# Patient Record
Sex: FEMALE | Race: OTHER | HISPANIC OR LATINO | URBAN - METROPOLITAN AREA
[De-identification: names, ages, dates, MRNs, and addresses within clinical notes are randomized per-mention and may not be internally consistent; named-entity substitution may affect disease eponyms.]

---

## 2017-03-28 ENCOUNTER — INPATIENT (INPATIENT)
Facility: HOSPITAL | Age: 67
LOS: 2 days | Discharge: HOME | DRG: 535 | End: 2017-03-31
Attending: ORTHOPAEDIC SURGERY | Admitting: ORTHOPAEDIC SURGERY
Payer: COMMERCIAL

## 2017-03-28 VITALS
HEART RATE: 89 BPM | OXYGEN SATURATION: 100 % | SYSTOLIC BLOOD PRESSURE: 121 MMHG | TEMPERATURE: 99 F | DIASTOLIC BLOOD PRESSURE: 66 MMHG | WEIGHT: 130.07 LBS | RESPIRATION RATE: 16 BRPM | HEIGHT: 63 IN

## 2017-03-28 DIAGNOSIS — S72.009A FRACTURE OF UNSPECIFIED PART OF NECK OF UNSPECIFIED FEMUR, INITIAL ENCOUNTER FOR CLOSED FRACTURE: ICD-10-CM

## 2017-03-28 LAB
ALBUMIN SERPL ELPH-MCNC: 3.4 G/DL — LOW (ref 3.5–5)
ALP SERPL-CCNC: 80 U/L — SIGNIFICANT CHANGE UP (ref 40–120)
ALT FLD-CCNC: 23 U/L DA — SIGNIFICANT CHANGE UP (ref 10–60)
ANION GAP SERPL CALC-SCNC: 12 MMOL/L — SIGNIFICANT CHANGE UP (ref 5–17)
APTT BLD: 22.4 SEC — LOW (ref 27.5–37.4)
AST SERPL-CCNC: 23 U/L — SIGNIFICANT CHANGE UP (ref 10–40)
BASOPHILS # BLD AUTO: 0 K/UL — SIGNIFICANT CHANGE UP (ref 0–0.2)
BASOPHILS NFR BLD AUTO: 0.7 % — SIGNIFICANT CHANGE UP (ref 0–2)
BILIRUB SERPL-MCNC: 0.5 MG/DL — SIGNIFICANT CHANGE UP (ref 0.2–1.2)
BUN SERPL-MCNC: 14 MG/DL — SIGNIFICANT CHANGE UP (ref 7–18)
CALCIUM SERPL-MCNC: 8.4 MG/DL — SIGNIFICANT CHANGE UP (ref 8.4–10.5)
CHLORIDE SERPL-SCNC: 109 MMOL/L — HIGH (ref 96–108)
CO2 SERPL-SCNC: 21 MMOL/L — LOW (ref 22–31)
CREAT SERPL-MCNC: 0.57 MG/DL — SIGNIFICANT CHANGE UP (ref 0.5–1.3)
EOSINOPHIL # BLD AUTO: 0.4 K/UL — SIGNIFICANT CHANGE UP (ref 0–0.5)
EOSINOPHIL NFR BLD AUTO: 8.7 % — HIGH (ref 0–6)
GLUCOSE SERPL-MCNC: 106 MG/DL — HIGH (ref 70–99)
HCT VFR BLD CALC: 26.2 % — LOW (ref 34.5–45)
HGB BLD-MCNC: 7.5 G/DL — LOW (ref 11.5–15.5)
INR BLD: 1.05 RATIO — SIGNIFICANT CHANGE UP (ref 0.88–1.16)
LYMPHOCYTES # BLD AUTO: 1 K/UL — SIGNIFICANT CHANGE UP (ref 1–3.3)
LYMPHOCYTES # BLD AUTO: 20.2 % — SIGNIFICANT CHANGE UP (ref 13–44)
MCHC RBC-ENTMCNC: 18 PG — LOW (ref 27–34)
MCHC RBC-ENTMCNC: 28.4 GM/DL — LOW (ref 32–36)
MCV RBC AUTO: 63.3 FL — LOW (ref 80–100)
MONOCYTES # BLD AUTO: 0.3 K/UL — SIGNIFICANT CHANGE UP (ref 0–0.9)
MONOCYTES NFR BLD AUTO: 5.3 % — SIGNIFICANT CHANGE UP (ref 2–14)
NEUTROPHILS # BLD AUTO: 3.1 K/UL — SIGNIFICANT CHANGE UP (ref 1.8–7.4)
NEUTROPHILS NFR BLD AUTO: 65.1 % — SIGNIFICANT CHANGE UP (ref 43–77)
PLATELET # BLD AUTO: 410 K/UL — HIGH (ref 150–400)
POTASSIUM SERPL-MCNC: 3.2 MMOL/L — LOW (ref 3.5–5.3)
POTASSIUM SERPL-SCNC: 3.2 MMOL/L — LOW (ref 3.5–5.3)
PROT SERPL-MCNC: 6.7 G/DL — SIGNIFICANT CHANGE UP (ref 6–8.3)
PROTHROM AB SERPL-ACNC: 11.5 SEC — SIGNIFICANT CHANGE UP (ref 9.8–12.7)
RBC # BLD: 4.14 M/UL — SIGNIFICANT CHANGE UP (ref 3.8–5.2)
RBC # FLD: 20.9 % — HIGH (ref 10.3–14.5)
SODIUM SERPL-SCNC: 142 MMOL/L — SIGNIFICANT CHANGE UP (ref 135–145)
WBC # BLD: 4.8 K/UL — SIGNIFICANT CHANGE UP (ref 3.8–10.5)
WBC # FLD AUTO: 4.8 K/UL — SIGNIFICANT CHANGE UP (ref 3.8–10.5)

## 2017-03-28 PROCEDURE — 99285 EMERGENCY DEPT VISIT HI MDM: CPT

## 2017-03-28 PROCEDURE — 73700 CT LOWER EXTREMITY W/O DYE: CPT | Mod: 26,RT

## 2017-03-28 PROCEDURE — 73502 X-RAY EXAM HIP UNI 2-3 VIEWS: CPT | Mod: 26,RT

## 2017-03-28 RX ORDER — DEXTROSE MONOHYDRATE, SODIUM CHLORIDE, AND POTASSIUM CHLORIDE 50; .745; 4.5 G/1000ML; G/1000ML; G/1000ML
1000 INJECTION, SOLUTION INTRAVENOUS
Qty: 0 | Refills: 0 | Status: DISCONTINUED | OUTPATIENT
Start: 2017-03-28 | End: 2017-03-29

## 2017-03-28 RX ORDER — IPRATROPIUM/ALBUTEROL SULFATE 18-103MCG
3 AEROSOL WITH ADAPTER (GRAM) INHALATION EVERY 6 HOURS
Qty: 0 | Refills: 0 | Status: DISCONTINUED | OUTPATIENT
Start: 2017-03-28 | End: 2017-03-31

## 2017-03-28 RX ORDER — DIPHENHYDRAMINE HCL 50 MG
50 CAPSULE ORAL EVERY 6 HOURS
Qty: 0 | Refills: 0 | Status: DISCONTINUED | OUTPATIENT
Start: 2017-03-28 | End: 2017-03-31

## 2017-03-28 RX ADMIN — Medication 50 MILLIGRAM(S): at 22:31

## 2017-03-28 NOTE — ED PROVIDER NOTE - CONSTITUTIONAL, MLM
normal... Well appearing, well nourished, awake, alert, oriented to person, place, time/situation and in no apparent distress. Ambulatory with limp.

## 2017-03-28 NOTE — ED PROVIDER NOTE - MEDICAL DECISION MAKING DETAILS
65 y/o F pt c/o R leg, R hip pain, and R thigh pain x3 days s/p mechanical fall after slipping on water. Plan for X-Rays to evaluate fractures, and reassess. 65 y/o F pt c/o R leg, R hip pain, and R thigh pain x3 days s/p mechanical fall after slipping on water. Plan for X-Rays to evaluate fractures, and reassess. CT reveals that fx extends to acetabulum, potentially unstable. case discussed with Dr Davis who recommends admission for nonweightbearing until further evaluation.

## 2017-03-28 NOTE — ED PROVIDER NOTE - NS ED MD SCRIBE ATTENDING SCRIBE SECTIONS
REVIEW OF SYSTEMS/PAST MEDICAL/SURGICAL/SOCIAL HISTORY/DISPOSITION/HISTORY OF PRESENT ILLNESS/PHYSICAL EXAM/HIV/VITAL SIGNS( Pullset)

## 2017-03-28 NOTE — ED PROVIDER NOTE - OBJECTIVE STATEMENT
65 y/o F pt with no PMHx and no PSHx presents to ED c/o R leg pain, R hip pain, and R thigh pain x3 days s/p mechanical fall after slipping on water. Pt states pain is worse when walking. Pt denies LOC, numbness, tingling, weakness, or any other complaints. NKDA.

## 2017-03-29 DIAGNOSIS — S72.001A FRACTURE OF UNSPECIFIED PART OF NECK OF RIGHT FEMUR, INITIAL ENCOUNTER FOR CLOSED FRACTURE: ICD-10-CM

## 2017-03-29 LAB
ABO RH CONFIRMATION: SIGNIFICANT CHANGE UP
ANION GAP SERPL CALC-SCNC: 11 MMOL/L — SIGNIFICANT CHANGE UP (ref 5–17)
BUN SERPL-MCNC: 11 MG/DL — SIGNIFICANT CHANGE UP (ref 7–18)
CALCIUM SERPL-MCNC: 7.9 MG/DL — LOW (ref 8.4–10.5)
CHLORIDE SERPL-SCNC: 109 MMOL/L — HIGH (ref 96–108)
CO2 SERPL-SCNC: 24 MMOL/L — SIGNIFICANT CHANGE UP (ref 22–31)
CREAT SERPL-MCNC: 0.36 MG/DL — LOW (ref 0.5–1.3)
GLUCOSE SERPL-MCNC: 97 MG/DL — SIGNIFICANT CHANGE UP (ref 70–99)
HCT VFR BLD CALC: 23.6 % — LOW (ref 34.5–45)
HGB BLD-MCNC: 6.5 G/DL — CRITICAL LOW (ref 11.5–15.5)
IRON SATN MFR SERPL: 23 UG/DL — LOW (ref 40–150)
IRON SATN MFR SERPL: 5 % — LOW (ref 15–50)
LDH SERPL L TO P-CCNC: 267 U/L — HIGH (ref 120–225)
MAGNESIUM SERPL-MCNC: 2.1 MG/DL — SIGNIFICANT CHANGE UP (ref 1.8–2.4)
MCHC RBC-ENTMCNC: 17.5 PG — LOW (ref 27–34)
MCHC RBC-ENTMCNC: 27.7 GM/DL — LOW (ref 32–36)
MCV RBC AUTO: 63.3 FL — LOW (ref 80–100)
PHOSPHATE SERPL-MCNC: 3 MG/DL — SIGNIFICANT CHANGE UP (ref 2.5–4.5)
PLATELET # BLD AUTO: 385 K/UL — SIGNIFICANT CHANGE UP (ref 150–400)
POTASSIUM SERPL-MCNC: 3.4 MMOL/L — LOW (ref 3.5–5.3)
POTASSIUM SERPL-SCNC: 3.4 MMOL/L — LOW (ref 3.5–5.3)
RBC # BLD: 3.74 M/UL — LOW (ref 3.8–5.2)
RBC # FLD: 21 % — HIGH (ref 10.3–14.5)
SODIUM SERPL-SCNC: 144 MMOL/L — SIGNIFICANT CHANGE UP (ref 135–145)
TIBC SERPL-MCNC: 496 UG/DL — HIGH (ref 250–450)
TSH SERPL-MCNC: 1.11 UU/ML — SIGNIFICANT CHANGE UP (ref 0.34–4.82)
UIBC SERPL-MCNC: 473 UG/DL — HIGH (ref 110–370)
WBC # BLD: 4.1 K/UL — SIGNIFICANT CHANGE UP (ref 3.8–10.5)
WBC # FLD AUTO: 4.1 K/UL — SIGNIFICANT CHANGE UP (ref 3.8–10.5)

## 2017-03-29 RX ORDER — POTASSIUM CHLORIDE 20 MEQ
40 PACKET (EA) ORAL EVERY 4 HOURS
Qty: 0 | Refills: 0 | Status: COMPLETED | OUTPATIENT
Start: 2017-03-29 | End: 2017-03-29

## 2017-03-29 RX ORDER — FUROSEMIDE 40 MG
20 TABLET ORAL ONCE
Qty: 0 | Refills: 0 | Status: COMPLETED | OUTPATIENT
Start: 2017-03-29 | End: 2017-03-29

## 2017-03-29 RX ADMIN — Medication 40 MILLIEQUIVALENT(S): at 12:26

## 2017-03-29 RX ADMIN — Medication 20 MILLIGRAM(S): at 14:06

## 2017-03-29 RX ADMIN — Medication 40 MILLIEQUIVALENT(S): at 17:06

## 2017-03-29 RX ADMIN — Medication 50 MILLIGRAM(S): at 22:19

## 2017-03-29 NOTE — H&P ADULT. - PROBLEM SELECTOR PLAN 1
pain control, may weight bear as per Dr Davis if pt tolerates, will hold dvt prophylaxis secondary to low H&H and repeat CBC in am; venodynes; npo after mn for any possible interventions

## 2017-03-29 NOTE — H&P ADULT. - HISTORY OF PRESENT ILLNESS
65 y/o female with h/o asthma; c/o R hip pain s/p slip and fall Sunday. Pt is weight bearing without difficulty  CT R hip done in ED read as nondisplaced fractures of both pubic rami. The superior   fracture extends into the anterior acetabulum. The femoral head and neck   are intact. There is mild to moderate degenerative narrowing of the hip   joint. There is no diastases.

## 2017-03-30 LAB
24R-OH-CALCIDIOL SERPL-MCNC: 26 NG/ML — LOW (ref 30–100)
FERRITIN SERPL-MCNC: 10.7 NG/ML — LOW (ref 15–150)
FOLATE SERPL-MCNC: 17.2 NG/ML — SIGNIFICANT CHANGE UP (ref 4.8–24.2)
HAPTOGLOB SERPL-MCNC: 113 MG/DL — SIGNIFICANT CHANGE UP (ref 34–200)
HCT VFR BLD CALC: 30.8 % — LOW (ref 34.5–45)
HGB BLD-MCNC: 9.2 G/DL — LOW (ref 11.5–15.5)
MCHC RBC-ENTMCNC: 20.1 PG — LOW (ref 27–34)
MCHC RBC-ENTMCNC: 29.8 GM/DL — LOW (ref 32–36)
MCV RBC AUTO: 67.4 FL — LOW (ref 80–100)
PLATELET # BLD AUTO: 391 K/UL — SIGNIFICANT CHANGE UP (ref 150–400)
RBC # BLD: 4.38 M/UL — SIGNIFICANT CHANGE UP (ref 3.8–5.2)
RBC # BLD: 4.56 M/UL — SIGNIFICANT CHANGE UP (ref 3.8–5.2)
RBC # FLD: 23.3 % — HIGH (ref 10.3–14.5)
RETICS #: 52.1 K/UL — SIGNIFICANT CHANGE UP (ref 25–125)
RETICS/RBC NFR: 1.2 % — SIGNIFICANT CHANGE UP (ref 0.5–2.5)
T PALLIDUM AB TITR SER: NEGATIVE — SIGNIFICANT CHANGE UP
TRANSFERRIN SERPL-MCNC: 372 MG/DL — HIGH (ref 200–360)
VIT B12 SERPL-MCNC: 340 PG/ML — SIGNIFICANT CHANGE UP (ref 243–894)
WBC # BLD: 4.2 K/UL — SIGNIFICANT CHANGE UP (ref 3.8–10.5)
WBC # FLD AUTO: 4.2 K/UL — SIGNIFICANT CHANGE UP (ref 3.8–10.5)

## 2017-03-30 PROCEDURE — 72195 MRI PELVIS W/O DYE: CPT | Mod: 26

## 2017-03-30 RX ORDER — HEPARIN SODIUM 5000 [USP'U]/ML
5000 INJECTION INTRAVENOUS; SUBCUTANEOUS EVERY 8 HOURS
Qty: 0 | Refills: 0 | Status: DISCONTINUED | OUTPATIENT
Start: 2017-03-30 | End: 2017-03-31

## 2017-03-30 RX ORDER — IRON SUCROSE 20 MG/ML
300 INJECTION, SOLUTION INTRAVENOUS DAILY
Qty: 0 | Refills: 0 | Status: COMPLETED | OUTPATIENT
Start: 2017-03-30 | End: 2017-03-31

## 2017-03-30 RX ORDER — ASPIRIN/CALCIUM CARB/MAGNESIUM 324 MG
1 TABLET ORAL
Qty: 35 | Refills: 0
Start: 2017-03-30 | End: 2017-03-31

## 2017-03-30 RX ORDER — ASPIRIN/CALCIUM CARB/MAGNESIUM 324 MG
1 TABLET ORAL
Qty: 0 | Refills: 0 | COMMUNITY

## 2017-03-30 RX ORDER — ASPIRIN/CALCIUM CARB/MAGNESIUM 324 MG
1 TABLET ORAL
Qty: 70 | Refills: 0 | OUTPATIENT
Start: 2017-03-30 | End: 2017-05-04

## 2017-03-30 RX ADMIN — HEPARIN SODIUM 5000 UNIT(S): 5000 INJECTION INTRAVENOUS; SUBCUTANEOUS at 21:14

## 2017-03-30 RX ADMIN — IRON SUCROSE 176.67 MILLIGRAM(S): 20 INJECTION, SOLUTION INTRAVENOUS at 20:16

## 2017-03-30 RX ADMIN — HEPARIN SODIUM 5000 UNIT(S): 5000 INJECTION INTRAVENOUS; SUBCUTANEOUS at 13:22

## 2017-03-30 RX ADMIN — Medication 50 MILLIGRAM(S): at 18:08

## 2017-03-30 NOTE — DISCHARGE NOTE ADULT - MEDICATION SUMMARY - MEDICATIONS TO STOP TAKING
I will STOP taking the medications listed below when I get home from the hospital:  None I will STOP taking the medications listed below when I get home from the hospital:     mg oral tablet  -- 1 tab(s) by mouth every 6 hours prn pain/swelling  -- Do not take this drug if you are pregnant.  It is very important that you take or use this exactly as directed.  Do not skip doses or discontinue unless directed by your doctor.  May cause drowsiness or dizziness.  Obtain medical advice before taking any non-prescription drugs as some may affect the action of this medication.  Take with food or milk.

## 2017-03-30 NOTE — DISCHARGE NOTE ADULT - PATIENT PORTAL LINK FT
“You can access the FollowHealth Patient Portal, offered by [facility], by registering with the following website: [email or web address]”

## 2017-03-30 NOTE — DISCHARGE NOTE ADULT - CARE PROVIDER_API CALL
Sander Davis), Orthopaedic Surgery  [address] [place] [address]  Phone: [phone]  Fax: [phone]

## 2017-03-30 NOTE — DISCHARGE NOTE ADULT - PLAN OF CARE
IMPROVE ROM 1. pain management  2. Take aspirin 325mg twice daily by mouth for 5 weeks  3. Partial weight bearing of right leg with walker  4. Follow up with Dr. Davis within 2 weeks 1. Follow up as outpatient with Gastroenterologist  2. Follow up as outpatient for Endoscopy/Colonoscopy 1. pain management  2. Take aspirin 325mg once daily by mouth for 5 weeks  3. Partial weight bearing of right leg with walker  4. Follow up with Dr. Davis within 2 weeks

## 2017-03-30 NOTE — DISCHARGE NOTE ADULT - CARE PLAN
Principal Discharge DX:	Closed fracture of right hip, initial encounter  Goal:	IMPROVE ROM Principal Discharge DX:	Closed fracture of right hip, initial encounter  Goal:	IMPROVE ROM  Instructions for follow-up, activity and diet:	1. pain management  2. Take aspirin 325mg twice daily by mouth for 5 weeks  3. Partial weight bearing of right leg with walker  4. Follow up with Dr. Davis within 2 weeks Principal Discharge DX:	Closed fracture of right hip, initial encounter  Goal:	IMPROVE ROM  Instructions for follow-up, activity and diet:	1. pain management  2. Take aspirin 325mg once daily by mouth for 5 weeks  3. Partial weight bearing of right leg with walker  4. Follow up with Dr. Davis within 2 weeks  Secondary Diagnosis:	Anemia  Instructions for follow-up, activity and diet:	1. Follow up as outpatient with Gastroenterologist  2. Follow up as outpatient for Endoscopy/Colonoscopy

## 2017-03-30 NOTE — DISCHARGE NOTE ADULT - HOSPITAL COURSE
patient with Right pubic ramus fx and right acetabular fracture (non-displaced).  Pt was admitted with Hemoglobin in 6.5.  Patient was transfused 2units pRBC on 3/29/2017.  MRI of pelvis showed patient with Right pubic ramus fx and right acetabular fracture (non-displaced).  Pt was admitted with Hemoglobin in 6.5.  Patient was transfused 2units pRBC on 3/29/2017.  MRI of pelvis showed puboacetabular fracture.

## 2017-03-30 NOTE — DISCHARGE NOTE ADULT - MEDICATION SUMMARY - MEDICATIONS TO TAKE
I will START or STAY ON the medications listed below when I get home from the hospital:    Percocet 5/325 oral tablet  -- 1-2 tab(s) by mouth every 6 hours as needed for moderate pain  -- Indication: For PAIN    aspirin 325 mg oral tablet  -- 1 tab(s) by mouth 2 times a day  -- X 4 WEEKS.  D/C ON 4/28/2017  -- Indication: For DVT PPX    albuterol 0.63 mg/3 mL (0.021%) inhalation solution  -- 3 milliliter(s) inhaled 3 times a day as needed for asthma/wheezing  -- Indication: For AS PER MD    methocarbamol 500 mg oral tablet  -- 2 tab(s) by mouth every 6 hours prn pain/spasm  -- May cause drowsiness.  Alcohol may intensify this effect.  Use care when operating dangerous machinery.    -- Indication: For AS PER MD I will START or STAY ON the medications listed below when I get home from the hospital:    aspirin 325 mg oral tablet  -- 1 tab(s) by mouth 2 times a day  -- X 4 WEEKS.  D/C ON 4/28/2017  -- Indication: For dvt prophylaxis    Percocet 5/325 oral tablet  -- 1 tab(s) by mouth every 4 hours MDD:6 tabs  -- Indication: For pain    albuterol 0.63 mg/3 mL (0.021%) inhalation solution  -- 3 milliliter(s) inhaled 3 times a day as needed for asthma/wheezing  -- Indication: For AS PER MD    methocarbamol 500 mg oral tablet  -- 2 tab(s) by mouth every 6 hours prn pain/spasm  -- May cause drowsiness.  Alcohol may intensify this effect.  Use care when operating dangerous machinery.    -- Indication: For AS PER MD I will START or STAY ON the medications listed below when I get home from the hospital:    Percocet 5/325 oral tablet  -- 1 tab(s) by mouth every 4 hours MDD:6 tabs  -- Indication: For pain    Ascriptin buffered 325 mg oral tablet  -- 1 tab(s) by mouth once a day MDD:1  -- Take with food or milk.    -- Indication: For dvt ppx    albuterol 0.63 mg/3 mL (0.021%) inhalation solution  -- 3 milliliter(s) inhaled 3 times a day as needed for asthma/wheezing  -- Indication: For AS PER MD    methocarbamol 500 mg oral tablet  -- 2 tab(s) by mouth every 6 hours prn pain/spasm  -- May cause drowsiness.  Alcohol may intensify this effect.  Use care when operating dangerous machinery.    -- Indication: For AS PER MD

## 2017-03-31 VITALS — SYSTOLIC BLOOD PRESSURE: 118 MMHG | OXYGEN SATURATION: 99 % | DIASTOLIC BLOOD PRESSURE: 64 MMHG | HEART RATE: 70 BPM

## 2017-03-31 PROCEDURE — 86920 COMPATIBILITY TEST SPIN: CPT

## 2017-03-31 PROCEDURE — 85027 COMPLETE CBC AUTOMATED: CPT

## 2017-03-31 PROCEDURE — 72195 MRI PELVIS W/O DYE: CPT

## 2017-03-31 PROCEDURE — 84100 ASSAY OF PHOSPHORUS: CPT

## 2017-03-31 PROCEDURE — 73700 CT LOWER EXTREMITY W/O DYE: CPT

## 2017-03-31 PROCEDURE — 83550 IRON BINDING TEST: CPT

## 2017-03-31 PROCEDURE — 85730 THROMBOPLASTIN TIME PARTIAL: CPT

## 2017-03-31 PROCEDURE — 83615 LACTATE (LD) (LDH) ENZYME: CPT

## 2017-03-31 PROCEDURE — 82746 ASSAY OF FOLIC ACID SERUM: CPT

## 2017-03-31 PROCEDURE — 83735 ASSAY OF MAGNESIUM: CPT

## 2017-03-31 PROCEDURE — 36430 TRANSFUSION BLD/BLD COMPNT: CPT

## 2017-03-31 PROCEDURE — 82306 VITAMIN D 25 HYDROXY: CPT

## 2017-03-31 PROCEDURE — P9040: CPT

## 2017-03-31 PROCEDURE — 80053 COMPREHEN METABOLIC PANEL: CPT

## 2017-03-31 PROCEDURE — 85610 PROTHROMBIN TIME: CPT

## 2017-03-31 PROCEDURE — 86900 BLOOD TYPING SEROLOGIC ABO: CPT

## 2017-03-31 PROCEDURE — 84466 ASSAY OF TRANSFERRIN: CPT

## 2017-03-31 PROCEDURE — 82607 VITAMIN B-12: CPT

## 2017-03-31 PROCEDURE — 82728 ASSAY OF FERRITIN: CPT

## 2017-03-31 PROCEDURE — 83010 ASSAY OF HAPTOGLOBIN QUANT: CPT

## 2017-03-31 PROCEDURE — 86780 TREPONEMA PALLIDUM: CPT

## 2017-03-31 PROCEDURE — 84443 ASSAY THYROID STIM HORMONE: CPT

## 2017-03-31 PROCEDURE — 99285 EMERGENCY DEPT VISIT HI MDM: CPT | Mod: 25

## 2017-03-31 PROCEDURE — 93005 ELECTROCARDIOGRAM TRACING: CPT

## 2017-03-31 PROCEDURE — 73502 X-RAY EXAM HIP UNI 2-3 VIEWS: CPT

## 2017-03-31 PROCEDURE — 85045 AUTOMATED RETICULOCYTE COUNT: CPT

## 2017-03-31 PROCEDURE — 80048 BASIC METABOLIC PNL TOTAL CA: CPT

## 2017-03-31 PROCEDURE — 86850 RBC ANTIBODY SCREEN: CPT

## 2017-03-31 PROCEDURE — 86901 BLOOD TYPING SEROLOGIC RH(D): CPT

## 2017-03-31 RX ADMIN — IRON SUCROSE 176.67 MILLIGRAM(S): 20 INJECTION, SOLUTION INTRAVENOUS at 11:57

## 2017-03-31 RX ADMIN — Medication 50 MILLIGRAM(S): at 10:24

## 2017-03-31 RX ADMIN — HEPARIN SODIUM 5000 UNIT(S): 5000 INJECTION INTRAVENOUS; SUBCUTANEOUS at 05:31

## 2017-03-31 NOTE — PHYSICAL THERAPY INITIAL EVALUATION ADULT - CRITERIA FOR SKILLED THERAPEUTIC INTERVENTIONS
anticipated discharge recommendation/predicted duration of therapy intervention/therapy frequency/anticipated equipment needs at discharge/impairments found

## 2017-03-31 NOTE — PHYSICAL THERAPY INITIAL EVALUATION ADULT - ACTIVE RANGE OF MOTION EXAMINATION, REHAB EVAL
bilateral  lower extremity Active ROM was WFL (within functional limits)/bilateral upper extremity Active ROM was WFL (within functional limits)/With C/O pain in Right hip.

## 2017-04-04 DIAGNOSIS — Z28.21 IMMUNIZATION NOT CARRIED OUT BECAUSE OF PATIENT REFUSAL: ICD-10-CM

## 2017-04-04 DIAGNOSIS — S70.11XA CONTUSION OF RIGHT THIGH, INITIAL ENCOUNTER: ICD-10-CM

## 2017-04-04 DIAGNOSIS — Z91.040 LATEX ALLERGY STATUS: ICD-10-CM

## 2017-04-04 DIAGNOSIS — D50.0 IRON DEFICIENCY ANEMIA SECONDARY TO BLOOD LOSS (CHRONIC): ICD-10-CM

## 2017-04-04 DIAGNOSIS — S32.591A OTHER SPECIFIED FRACTURE OF RIGHT PUBIS, INITIAL ENCOUNTER FOR CLOSED FRACTURE: ICD-10-CM
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
## 2017-04-04 DIAGNOSIS — S32.592A OTHER SPECIFIED FRACTURE OF LEFT PUBIS, INITIAL ENCOUNTER FOR CLOSED FRACTURE: ICD-10-CM
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
## 2017-04-04 DIAGNOSIS — J45.909 UNSPECIFIED ASTHMA, UNCOMPLICATED: ICD-10-CM

## 2017-04-04 DIAGNOSIS — S32.401A UNSPECIFIED FRACTURE OF RIGHT ACETABULUM, INITIAL ENCOUNTER FOR CLOSED FRACTURE: ICD-10-CM
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
## 2017-04-05 DIAGNOSIS — D63.8 ANEMIA IN OTHER CHRONIC DISEASES CLASSIFIED ELSEWHERE: ICD-10-CM

## 2020-09-22 NOTE — ED PROVIDER NOTE - VASCULAR COMPROMISE
Physical Therapy  Physical Therapy Treatment Note    Name: Kb Ozuna  : 1946  MRN: [id number]    Referring Provider:  Giulia Falcon MD     Date of Service: 2020    Evaluating PT:  Main Sotelo, PT, DPT [id number]    Room #:  5525/1701-I  Diagnosis:  Hydropcephalus  Precautions: Falls,   Procedure/Surgery:   insertion of lumbar drain  PMHx/PSHx:  CA, CAD, COPD, HLD, HTN, PVD, RA  Equipment Needs:  TBD    SUBJECTIVE:    Pt lives with daughter in a 2 story home with 3 stairs to enter and 2 rail. Full flight of steps and 1 rail to bedroom. Pt ambulated with [employer] PTA. OBJECTIVE:   Re-Evaluation  Date: 20 Treatment  20 Short Term/ Long Term   Goals   AM-PAC 6 Clicks 31/10 84/61    Was pt agreeable to Eval/treatment? Yes Yes    Does pt have pain? No c/o pain 4/10 neck and back pain    Bed Mobility  Rolling: NT  Supine to sit: SBA with HOB elevated  Sit to supine: NT  Scooting: SBA Rolling: NT  Supine to sit: SBA with HOB elevated  Sit to supine: SBA  Scooting: SBA Mod Independent   Transfers Sit to stand: Hany  Stand to sit: Hany  Stand pivot: Hany with Ww Sit to stand: SBA  Stand to sit: SBA  Stand pivot: CGA with [employer] Mod Independent with [employer]   Ambulation   80 feet with Hany with [employer] 100 feet with CGA with [employer] >400 feet with Mod Independent with [employer]   Stair negotiation: ascended and descended NT NT >10 steps with 1 rail Mod Independent   ROM BUE:  Defer to OT note  BLE:  WNL     Strength BUE:  Defer to OT note  BLE:  4/5  Increase by 1/3 MMT grade   Balance Sitting EOB:  Hany  Dynamic Standing:  Hany with [employer] Sitting EOB:  SBA  Dynamic Standing:  CGA with [employer] Sitting EOB:  Independent  Dynamic Standing:   Mod Independent with [employer]     Pt is A & O x 4  Sensation:  WNL  Edema:  None    Therapeutic Exercises:  NA    Patient education  Pt educated on safety    Patient response to education:   Pt verbalized understanding Pt demonstrated skill Pt requires further education in this area   x x x no vascular compromise

## 2021-03-30 RX ADMIN — OXYCODONE AND ACETAMINOPHEN 1 TABLET(S): 5; 325 TABLET ORAL at 23:00

## 2021-03-31 ENCOUNTER — INPATIENT (INPATIENT)
Facility: HOSPITAL | Age: 71
LOS: 1 days | Discharge: HOME | DRG: 812 | End: 2021-04-02
Attending: INTERNAL MEDICINE | Admitting: INTERNAL MEDICINE
Payer: COMMERCIAL

## 2021-03-31 VITALS
RESPIRATION RATE: 16 BRPM | TEMPERATURE: 99 F | HEIGHT: 63 IN | WEIGHT: 139.99 LBS | DIASTOLIC BLOOD PRESSURE: 69 MMHG | OXYGEN SATURATION: 99 % | SYSTOLIC BLOOD PRESSURE: 137 MMHG | HEART RATE: 96 BPM

## 2021-03-31 DIAGNOSIS — S32.9XXA FRACTURE OF UNSPECIFIED PARTS OF LUMBOSACRAL SPINE AND PELVIS, INITIAL ENCOUNTER FOR CLOSED FRACTURE: ICD-10-CM

## 2021-03-31 LAB
ALBUMIN SERPL ELPH-MCNC: 3.4 G/DL — LOW (ref 3.5–5)
ALP SERPL-CCNC: 92 U/L — SIGNIFICANT CHANGE UP (ref 40–120)
ALT FLD-CCNC: 29 U/L DA — SIGNIFICANT CHANGE UP (ref 10–60)
ANION GAP SERPL CALC-SCNC: 8 MMOL/L — SIGNIFICANT CHANGE UP (ref 5–17)
ANISOCYTOSIS BLD QL: SLIGHT — SIGNIFICANT CHANGE UP
APTT BLD: 25.5 SEC — LOW (ref 27.5–35.5)
AST SERPL-CCNC: 27 U/L — SIGNIFICANT CHANGE UP (ref 10–40)
BASOPHILS # BLD AUTO: 0.02 K/UL — SIGNIFICANT CHANGE UP (ref 0–0.2)
BASOPHILS NFR BLD AUTO: 0.3 % — SIGNIFICANT CHANGE UP (ref 0–2)
BILIRUB SERPL-MCNC: 0.4 MG/DL — SIGNIFICANT CHANGE UP (ref 0.2–1.2)
BLD GP AB SCN SERPL QL: SIGNIFICANT CHANGE UP
BUN SERPL-MCNC: 20 MG/DL — HIGH (ref 7–18)
CALCIUM SERPL-MCNC: 8.2 MG/DL — LOW (ref 8.4–10.5)
CHLORIDE SERPL-SCNC: 106 MMOL/L — SIGNIFICANT CHANGE UP (ref 96–108)
CO2 SERPL-SCNC: 26 MMOL/L — SIGNIFICANT CHANGE UP (ref 22–31)
CREAT SERPL-MCNC: 0.85 MG/DL — SIGNIFICANT CHANGE UP (ref 0.5–1.3)
DACRYOCYTES BLD QL SMEAR: SLIGHT — SIGNIFICANT CHANGE UP
ELLIPTOCYTES BLD QL SMEAR: SLIGHT — SIGNIFICANT CHANGE UP
EOSINOPHIL # BLD AUTO: 0.49 K/UL — SIGNIFICANT CHANGE UP (ref 0–0.5)
EOSINOPHIL NFR BLD AUTO: 6.9 % — HIGH (ref 0–6)
ETHANOL SERPL-MCNC: <3 MG/DL — SIGNIFICANT CHANGE UP (ref 0–10)
GLUCOSE SERPL-MCNC: 147 MG/DL — HIGH (ref 70–99)
HCT VFR BLD CALC: 22.1 % — LOW (ref 34.5–45)
HGB BLD-MCNC: 5.8 G/DL — CRITICAL LOW (ref 11.5–15.5)
HYPOCHROMIA BLD QL: SIGNIFICANT CHANGE UP
IMM GRANULOCYTES NFR BLD AUTO: 0.6 % — SIGNIFICANT CHANGE UP (ref 0–1.5)
INR BLD: 1.08 RATIO — SIGNIFICANT CHANGE UP (ref 0.88–1.16)
LYMPHOCYTES # BLD AUTO: 1.05 K/UL — SIGNIFICANT CHANGE UP (ref 1–3.3)
LYMPHOCYTES # BLD AUTO: 14.7 % — SIGNIFICANT CHANGE UP (ref 13–44)
MANUAL SMEAR VERIFICATION: SIGNIFICANT CHANGE UP
MCHC RBC-ENTMCNC: 14.8 PG — LOW (ref 27–34)
MCHC RBC-ENTMCNC: 26.2 GM/DL — LOW (ref 32–36)
MCV RBC AUTO: 56.2 FL — LOW (ref 80–100)
MICROCYTES BLD QL: SLIGHT — SIGNIFICANT CHANGE UP
MONOCYTES # BLD AUTO: 0.4 K/UL — SIGNIFICANT CHANGE UP (ref 0–0.9)
MONOCYTES NFR BLD AUTO: 5.6 % — SIGNIFICANT CHANGE UP (ref 2–14)
NEUTROPHILS # BLD AUTO: 5.15 K/UL — SIGNIFICANT CHANGE UP (ref 1.8–7.4)
NEUTROPHILS NFR BLD AUTO: 71.9 % — SIGNIFICANT CHANGE UP (ref 43–77)
NRBC # BLD: 0 /100 WBCS — SIGNIFICANT CHANGE UP (ref 0–0)
OB PNL STL: NEGATIVE — SIGNIFICANT CHANGE UP
OVALOCYTES BLD QL SMEAR: SIGNIFICANT CHANGE UP
PLAT MORPH BLD: NORMAL — SIGNIFICANT CHANGE UP
PLATELET # BLD AUTO: 443 K/UL — HIGH (ref 150–400)
POIKILOCYTOSIS BLD QL AUTO: SLIGHT — SIGNIFICANT CHANGE UP
POTASSIUM SERPL-MCNC: 3.4 MMOL/L — LOW (ref 3.5–5.3)
POTASSIUM SERPL-SCNC: 3.4 MMOL/L — LOW (ref 3.5–5.3)
PROT SERPL-MCNC: 6.4 G/DL — SIGNIFICANT CHANGE UP (ref 6–8.3)
PROTHROM AB SERPL-ACNC: 12.8 SEC — SIGNIFICANT CHANGE UP (ref 10.6–13.6)
RBC # BLD: 3.93 M/UL — SIGNIFICANT CHANGE UP (ref 3.8–5.2)
RBC # FLD: 21.5 % — HIGH (ref 10.3–14.5)
RBC BLD AUTO: ABNORMAL
SODIUM SERPL-SCNC: 140 MMOL/L — SIGNIFICANT CHANGE UP (ref 135–145)
TARGETS BLD QL SMEAR: SLIGHT — SIGNIFICANT CHANGE UP
TROPONIN I SERPL-MCNC: <0.015 NG/ML — SIGNIFICANT CHANGE UP (ref 0–0.04)
WBC # BLD: 7.15 K/UL — SIGNIFICANT CHANGE UP (ref 3.8–10.5)
WBC # FLD AUTO: 7.15 K/UL — SIGNIFICANT CHANGE UP (ref 3.8–10.5)

## 2021-03-31 PROCEDURE — 70450 CT HEAD/BRAIN W/O DYE: CPT | Mod: 26

## 2021-03-31 PROCEDURE — 72125 CT NECK SPINE W/O DYE: CPT | Mod: 26

## 2021-03-31 PROCEDURE — 73030 X-RAY EXAM OF SHOULDER: CPT | Mod: 26,LT

## 2021-03-31 PROCEDURE — 73501 X-RAY EXAM HIP UNI 1 VIEW: CPT | Mod: 26,LT

## 2021-03-31 PROCEDURE — 71045 X-RAY EXAM CHEST 1 VIEW: CPT | Mod: 26

## 2021-03-31 PROCEDURE — 99285 EMERGENCY DEPT VISIT HI MDM: CPT

## 2021-03-31 PROCEDURE — 73080 X-RAY EXAM OF ELBOW: CPT | Mod: 26,LT

## 2021-03-31 RX ORDER — DIPHENHYDRAMINE HCL 50 MG
25 CAPSULE ORAL ONCE
Refills: 0 | Status: COMPLETED | OUTPATIENT
Start: 2021-03-31 | End: 2021-03-31

## 2021-03-31 RX ORDER — PANTOPRAZOLE SODIUM 20 MG/1
40 TABLET, DELAYED RELEASE ORAL ONCE
Refills: 0 | Status: COMPLETED | OUTPATIENT
Start: 2021-03-31 | End: 2021-03-31

## 2021-03-31 RX ORDER — OXYCODONE AND ACETAMINOPHEN 5; 325 MG/1; MG/1
1 TABLET ORAL ONCE
Refills: 0 | Status: DISCONTINUED | OUTPATIENT
Start: 2021-03-31 | End: 2021-03-31

## 2021-03-31 RX ADMIN — PANTOPRAZOLE SODIUM 40 MILLIGRAM(S): 20 TABLET, DELAYED RELEASE ORAL at 20:51

## 2021-03-31 RX ADMIN — OXYCODONE AND ACETAMINOPHEN 1 TABLET(S): 5; 325 TABLET ORAL at 22:34

## 2021-03-31 NOTE — H&P ADULT - PROBLEM SELECTOR PLAN 1
patient presented after fall at home  noted to have left pubic rami fracture on hip x-ray; f/u official read  f/u shoulder x-ray  ortho consulted by ED  pain control  PT kaylynn

## 2021-03-31 NOTE — H&P ADULT - PROBLEM SELECTOR PLAN 2
patient noted to have Hgb of 5.8 in ED  patient has some ecchymosis of LUE  denies any melena or hematemesis  FOBT negative  may be from PUD; NSAID use   1 PRBC given in ED  patient states her baseline Hgb is around 7  will do post transfusion CBC and monitor Hgb  monitor CBC daily   protonix daily patient noted to have Hgb of 5.8 in ED  patient has some ecchymosis of LUE  denies any melena or hematemesis  FOBT negative  may be from PUD; NSAID use   1 PRBC given in ED  patient states her baseline Hgb is around 7  morning Hgb of 6.5; will order 2nd PRBC  GI, Dr. Tan consulted  monitor CBC daily   protonix IV BID

## 2021-03-31 NOTE — ED ADULT NURSE NOTE - OBJECTIVE STATEMENT
pt is here for s/p fall.  pt stated that fall down d/t loss of balance on Saturday, left shoulder and arm pain with bruises, left groin pain, denied chest pain or sob, denied LOC or headache, no distress noted at this time.

## 2021-03-31 NOTE — H&P ADULT - MUSCULOSKELETAL
decreased ROM in LLE due to pain/no joint swelling/no joint erythema/no joint warmth/no calf tenderness/decreased ROM due to pain details… detailed exam

## 2021-03-31 NOTE — ED PROVIDER NOTE - PHYSICAL EXAMINATION
General: pt lying in stretcher, appears stated age and is not in distress  HEENT: AT/NC, pink conjunctiva, anicteric sclerae, EOMI, PERRLA, mmm  Neck: supple, full ROM, trachea midline, no JVD, no cervical LAD, no midline ttp or stepoffs  Lungs: symmetric excursion, b/l clear vesicular breath sounds with no wheezes, crackles, or rhonchi  Heart: rrr, S1, S2 normal; no S3 or S4; no murmurs or rubs  Abd: normal bowel sounds; soft, nontender; negative McBurney's point tenderness, negative Thompson's sign, no rebound, no guarding, spleen non-palpable; no hepatomegaly, no masses  Back: no midline spinal tenderness or stepoffs, no costovertebral angle tenderness  Extremities: no clubbing, cyanosis, or edema; no palpable deformities or fractures; LUE w/ ecchymosis at shoulder to elbow w/ tenderness but no deformity and 2+distal RP; tenderness at medial left hip  Skin: good turgor; no rashes, or jaundice  Pulses: radial, posterior tibialis (PT), dorsalis pedis (DP) all 2+ & symmetric  Neuro: awake, alert, responsive; oriented to person, place and time; cranial nerves intact, EOMI, intact jaw movement, intact facial sensation, no facial asymmetry, hearing intact; no nystagmus, tongue midline; Motor: Normal tone in upper and lower extremities bilaterally strength 5/5; Sensory: intact

## 2021-03-31 NOTE — ED ADULT NURSE NOTE - CHIEF COMPLAINT QUOTE
S/P fall last Saturday with c/o pain to left shoulder, left arm and left groin" I was sitting for a long time, when I got up my legs were weak and I lost my balance and fell". Son is concerned that pt is anemic as has been eating a lot of ice lately. Son Willem [phone]

## 2021-03-31 NOTE — H&P ADULT - PROBLEM SELECTOR PLAN 3
noted to have potassium of 3.4 in ED  supplement ordered  monitor BMP daily and replace electrolytes as needed

## 2021-03-31 NOTE — H&P ADULT - HISTORY OF PRESENT ILLNESS
Patient is a 70 year old female from home AAO x3, with PMH of PUD, anemia, asthma, arthritis and osteoporosis, who presented to the ED due to fall. Patient states that on Saturday, she was at a party and drank some alcohol and was sitting for a while and then when she got up, she felt cramps in her legs and lost her balance and had fallen mainly on to her left side. Patient denies any LOC or head trauma. Patient has been having some pain in left pubic area wh Patient is a 70 year old female from home AAO x3, with PMH of PUD, anemia, asthma, arthritis and osteoporosis, who presented to the ED due to fall. Patient states that on Saturday, she was at a party and drank some alcohol and was sitting for a while and then when she got up, she felt cramps in her legs and lost her balance and had fallen mainly on to her left side. Patient denies any LOC or head trauma. Patient has been having some pain in left pubic area when walking. Spoke to patient's son, Willem ([phone]) for further history. Son states that patient had fallen while at a party. Son also states that he believes the fall could also have been due to anemia. Son states patient has history of anemia and PUD noted on EGD about two years ago. States that patient has been eating ice chips a lot more recently and is likely due to anemia. Patient has received blood transfusions in the past. Currently patient denies any chest pain, SOB, melena, hematemesis, nausea, vomiting, abdominal pain or diarrhea.

## 2021-03-31 NOTE — ED PROVIDER NOTE - CLINICAL SUMMARY MEDICAL DECISION MAKING FREE TEXT BOX
Pt w/ aforementioned presentation found to have marked anemia and left pubic rami fx. Ortho consulted and patient given blood and admitted

## 2021-03-31 NOTE — H&P ADULT - ASSESSMENT
Patient is a 70 year old female from home AAO x3, with PMH of PUD, anemia, asthma, arthritis and osteoporosis, who presented to the ED due to fall.  Patient is a 70 year old female from home AAO x3, with PMH of PUD, anemia, asthma, arthritis and osteoporosis, who presented to the ED due to fall.     Hgb noted to be 5.8. FOBT negative. Potassium of 3.4. Troponin negative x1. BAL negative. CT head showing mild anterior periventricular white matter ischemia. Global atrophy. Cervical spine negative for vertebral fracture. Given dose of percocet, protonix and 1 PRBC in ED.  Hip xray noted to have left pubic rami fracture. Ortho consulted by ED.

## 2021-03-31 NOTE — ED ADULT NURSE NOTE - ED STAT RN HANDOFF DETAILS 2
Pt is alert and oriented x3. Pt is medicated for order. No sign of acute distress noted at this time. Pt is transferred to ED holding in stable condition.

## 2021-03-31 NOTE — H&P ADULT - PROBLEM SELECTOR PLAN 4
patient with history of PUD   may be cause of anemia   protonix daily   avoid NSAIDs patient with history of PUD   may be cause of anemia   protonix IV BID  avoid NSAIDs

## 2021-03-31 NOTE — ED ADULT TRIAGE NOTE - CHIEF COMPLAINT QUOTE
S/P fall last Saturday with c/o pain to left shoulder, left arm and left groin" I was sitting for a long time, when I got up my legs were weak and I lost my balance and fell". Son is concerned that pt is anemic as has been eating a lot of ice lately. Son Willem [phone]

## 2021-04-01 DIAGNOSIS — E87.6 HYPOKALEMIA: ICD-10-CM

## 2021-04-01 DIAGNOSIS — J45.909 UNSPECIFIED ASTHMA, UNCOMPLICATED: ICD-10-CM

## 2021-04-01 DIAGNOSIS — Z71.89 OTHER SPECIFIED COUNSELING: ICD-10-CM

## 2021-04-01 DIAGNOSIS — D64.9 ANEMIA, UNSPECIFIED: ICD-10-CM

## 2021-04-01 DIAGNOSIS — K27.9 PEPTIC ULCER, SITE UNSPECIFIED, UNSPECIFIED AS ACUTE OR CHRONIC, WITHOUT HEMORRHAGE OR PERFORATION: ICD-10-CM

## 2021-04-01 DIAGNOSIS — Z29.9 ENCOUNTER FOR PROPHYLACTIC MEASURES, UNSPECIFIED: ICD-10-CM

## 2021-04-01 DIAGNOSIS — S32.9XXA FRACTURE OF UNSPECIFIED PARTS OF LUMBOSACRAL SPINE AND PELVIS, INITIAL ENCOUNTER FOR CLOSED FRACTURE: ICD-10-CM

## 2021-04-01 LAB
A1C WITH ESTIMATED AVERAGE GLUCOSE RESULT: 5.7 % — HIGH (ref 4–5.6)
ESTIMATED AVERAGE GLUCOSE: 117 MG/DL — HIGH (ref 68–114)
HCT VFR BLD CALC: 24.3 % — LOW (ref 34.5–45)
HCT VFR BLD CALC: 27.8 % — LOW (ref 34.5–45)
HCV AB S/CO SERPL IA: 0.04 S/CO — SIGNIFICANT CHANGE UP (ref 0–0.99)
HCV AB SERPL-IMP: SIGNIFICANT CHANGE UP
HGB BLD-MCNC: 6.7 G/DL — CRITICAL LOW (ref 11.5–15.5)
HGB BLD-MCNC: 8.1 G/DL — LOW (ref 11.5–15.5)
MCHC RBC-ENTMCNC: 16.3 PG — LOW (ref 27–34)
MCHC RBC-ENTMCNC: 18 PG — LOW (ref 27–34)
MCHC RBC-ENTMCNC: 27.6 GM/DL — LOW (ref 32–36)
MCHC RBC-ENTMCNC: 29.1 GM/DL — LOW (ref 32–36)
MCV RBC AUTO: 59.3 FL — LOW (ref 80–100)
MCV RBC AUTO: 61.8 FL — LOW (ref 80–100)
NRBC # BLD: 0 /100 WBCS — SIGNIFICANT CHANGE UP (ref 0–0)
NRBC # BLD: 0 /100 WBCS — SIGNIFICANT CHANGE UP (ref 0–0)
PLATELET # BLD AUTO: 396 K/UL — SIGNIFICANT CHANGE UP (ref 150–400)
PLATELET # BLD AUTO: 408 K/UL — HIGH (ref 150–400)
RAPID RVP RESULT: SIGNIFICANT CHANGE UP
RBC # BLD: 4.1 M/UL — SIGNIFICANT CHANGE UP (ref 3.8–5.2)
RBC # BLD: 4.5 M/UL — SIGNIFICANT CHANGE UP (ref 3.8–5.2)
RBC # FLD: 26.5 % — HIGH (ref 10.3–14.5)
RBC # FLD: 28.7 % — HIGH (ref 10.3–14.5)
SARS-COV-2 RNA SPEC QL NAA+PROBE: SIGNIFICANT CHANGE UP
WBC # BLD: 5.78 K/UL — SIGNIFICANT CHANGE UP (ref 3.8–10.5)
WBC # BLD: 6.46 K/UL — SIGNIFICANT CHANGE UP (ref 3.8–10.5)
WBC # FLD AUTO: 5.78 K/UL — SIGNIFICANT CHANGE UP (ref 3.8–10.5)
WBC # FLD AUTO: 6.46 K/UL — SIGNIFICANT CHANGE UP (ref 3.8–10.5)

## 2021-04-01 PROCEDURE — 99233 SBSQ HOSP IP/OBS HIGH 50: CPT

## 2021-04-01 RX ORDER — ACETAMINOPHEN 500 MG
650 TABLET ORAL EVERY 6 HOURS
Refills: 0 | Status: DISCONTINUED | OUTPATIENT
Start: 2021-04-01 | End: 2021-04-02

## 2021-04-01 RX ORDER — PANTOPRAZOLE SODIUM 20 MG/1
40 TABLET, DELAYED RELEASE ORAL
Refills: 0 | Status: DISCONTINUED | OUTPATIENT
Start: 2021-04-01 | End: 2021-04-02

## 2021-04-01 RX ORDER — LIDOCAINE 4 G/100G
1 CREAM TOPICAL DAILY
Refills: 0 | Status: DISCONTINUED | OUTPATIENT
Start: 2021-04-01 | End: 2021-04-02

## 2021-04-01 RX ORDER — POLYETHYLENE GLYCOL 3350 17 G/17G
17 POWDER, FOR SOLUTION ORAL DAILY
Refills: 0 | Status: DISCONTINUED | OUTPATIENT
Start: 2021-04-01 | End: 2021-04-02

## 2021-04-01 RX ORDER — POTASSIUM CHLORIDE 20 MEQ
40 PACKET (EA) ORAL EVERY 4 HOURS
Refills: 0 | Status: COMPLETED | OUTPATIENT
Start: 2021-04-01 | End: 2021-04-01

## 2021-04-01 RX ORDER — PANTOPRAZOLE SODIUM 20 MG/1
40 TABLET, DELAYED RELEASE ORAL
Refills: 0 | Status: DISCONTINUED | OUTPATIENT
Start: 2021-04-01 | End: 2021-04-01

## 2021-04-01 RX ORDER — TRAMADOL HYDROCHLORIDE 50 MG/1
50 TABLET ORAL EVERY 8 HOURS
Refills: 0 | Status: DISCONTINUED | OUTPATIENT
Start: 2021-04-01 | End: 2021-04-02

## 2021-04-01 RX ORDER — DIPHENHYDRAMINE HCL 50 MG
25 CAPSULE ORAL EVERY 6 HOURS
Refills: 0 | Status: DISCONTINUED | OUTPATIENT
Start: 2021-04-01 | End: 2021-04-02

## 2021-04-01 RX ORDER — SENNA PLUS 8.6 MG/1
2 TABLET ORAL AT BEDTIME
Refills: 0 | Status: DISCONTINUED | OUTPATIENT
Start: 2021-04-01 | End: 2021-04-02

## 2021-04-01 RX ORDER — ONDANSETRON 8 MG/1
4 TABLET, FILM COATED ORAL EVERY 6 HOURS
Refills: 0 | Status: DISCONTINUED | OUTPATIENT
Start: 2021-04-01 | End: 2021-04-02

## 2021-04-01 RX ORDER — ALBUTEROL 90 UG/1
2 AEROSOL, METERED ORAL EVERY 6 HOURS
Refills: 0 | Status: DISCONTINUED | OUTPATIENT
Start: 2021-04-01 | End: 2021-04-02

## 2021-04-01 RX ADMIN — TRAMADOL HYDROCHLORIDE 50 MILLIGRAM(S): 50 TABLET ORAL at 17:39

## 2021-04-01 RX ADMIN — Medication 650 MILLIGRAM(S): at 08:57

## 2021-04-01 RX ADMIN — Medication 40 MILLIEQUIVALENT(S): at 04:35

## 2021-04-01 RX ADMIN — TRAMADOL HYDROCHLORIDE 50 MILLIGRAM(S): 50 TABLET ORAL at 18:00

## 2021-04-01 RX ADMIN — PANTOPRAZOLE SODIUM 40 MILLIGRAM(S): 20 TABLET, DELAYED RELEASE ORAL at 17:40

## 2021-04-01 RX ADMIN — Medication 40 MILLIEQUIVALENT(S): at 01:42

## 2021-04-01 RX ADMIN — Medication 650 MILLIGRAM(S): at 09:00

## 2021-04-01 RX ADMIN — LIDOCAINE 1 PATCH: 4 CREAM TOPICAL at 12:48

## 2021-04-01 RX ADMIN — Medication 25 MILLIGRAM(S): at 15:51

## 2021-04-01 RX ADMIN — Medication 25 MILLIGRAM(S): at 05:02

## 2021-04-01 RX ADMIN — PANTOPRAZOLE SODIUM 40 MILLIGRAM(S): 20 TABLET, DELAYED RELEASE ORAL at 05:02

## 2021-04-01 RX ADMIN — ALBUTEROL 2 PUFF(S): 90 AEROSOL, METERED ORAL at 17:40

## 2021-04-01 RX ADMIN — Medication 25 MILLIGRAM(S): at 00:21

## 2021-04-01 RX ADMIN — LIDOCAINE 1 PATCH: 4 CREAM TOPICAL at 19:23

## 2021-04-01 RX ADMIN — SENNA PLUS 2 TABLET(S): 8.6 TABLET ORAL at 21:05

## 2021-04-02 VITALS — WEIGHT: 139.99 LBS

## 2021-04-02 DIAGNOSIS — M25.552 PAIN IN LEFT HIP: ICD-10-CM

## 2021-04-02 DIAGNOSIS — D64.89 OTHER SPECIFIED ANEMIAS: ICD-10-CM

## 2021-04-02 DIAGNOSIS — S32.9XXA FRACTURE OF UNSPECIFIED PARTS OF LUMBOSACRAL SPINE AND PELVIS, INITIAL ENCOUNTER FOR CLOSED FRACTURE: ICD-10-CM

## 2021-04-02 LAB
ANION GAP SERPL CALC-SCNC: 7 MMOL/L — SIGNIFICANT CHANGE UP (ref 5–17)
BUN SERPL-MCNC: 10 MG/DL — SIGNIFICANT CHANGE UP (ref 7–18)
CALCIUM SERPL-MCNC: 8.3 MG/DL — LOW (ref 8.4–10.5)
CHLORIDE SERPL-SCNC: 106 MMOL/L — SIGNIFICANT CHANGE UP (ref 96–108)
CO2 SERPL-SCNC: 27 MMOL/L — SIGNIFICANT CHANGE UP (ref 22–31)
CREAT SERPL-MCNC: 0.54 MG/DL — SIGNIFICANT CHANGE UP (ref 0.5–1.3)
GLUCOSE SERPL-MCNC: 92 MG/DL — SIGNIFICANT CHANGE UP (ref 70–99)
HCT VFR BLD CALC: 29.6 % — LOW (ref 34.5–45)
HGB BLD-MCNC: 8.5 G/DL — LOW (ref 11.5–15.5)
MCHC RBC-ENTMCNC: 17.8 PG — LOW (ref 27–34)
MCHC RBC-ENTMCNC: 28.7 GM/DL — LOW (ref 32–36)
MCV RBC AUTO: 61.9 FL — LOW (ref 80–100)
NRBC # BLD: 0 /100 WBCS — SIGNIFICANT CHANGE UP (ref 0–0)
PLATELET # BLD AUTO: 396 K/UL — SIGNIFICANT CHANGE UP (ref 150–400)
POTASSIUM SERPL-MCNC: 4.1 MMOL/L — SIGNIFICANT CHANGE UP (ref 3.5–5.3)
POTASSIUM SERPL-SCNC: 4.1 MMOL/L — SIGNIFICANT CHANGE UP (ref 3.5–5.3)
RBC # BLD: 4.78 M/UL — SIGNIFICANT CHANGE UP (ref 3.8–5.2)
RBC # FLD: 28.7 % — HIGH (ref 10.3–14.5)
SODIUM SERPL-SCNC: 140 MMOL/L — SIGNIFICANT CHANGE UP (ref 135–145)
WBC # BLD: 6.3 K/UL — SIGNIFICANT CHANGE UP (ref 3.8–10.5)
WBC # FLD AUTO: 6.3 K/UL — SIGNIFICANT CHANGE UP (ref 3.8–10.5)

## 2021-04-02 PROCEDURE — 82306 VITAMIN D 25 HYDROXY: CPT

## 2021-04-02 PROCEDURE — 85610 PROTHROMBIN TIME: CPT

## 2021-04-02 PROCEDURE — 85027 COMPLETE CBC AUTOMATED: CPT

## 2021-04-02 PROCEDURE — 86769 SARS-COV-2 COVID-19 ANTIBODY: CPT

## 2021-04-02 PROCEDURE — 86803 HEPATITIS C AB TEST: CPT

## 2021-04-02 PROCEDURE — 0225U NFCT DS DNA&RNA 21 SARSCOV2: CPT

## 2021-04-02 PROCEDURE — 80061 LIPID PANEL: CPT

## 2021-04-02 PROCEDURE — 99285 EMERGENCY DEPT VISIT HI MDM: CPT

## 2021-04-02 PROCEDURE — 84443 ASSAY THYROID STIM HORMONE: CPT

## 2021-04-02 PROCEDURE — 82728 ASSAY OF FERRITIN: CPT

## 2021-04-02 PROCEDURE — 36430 TRANSFUSION BLD/BLD COMPNT: CPT

## 2021-04-02 PROCEDURE — 83540 ASSAY OF IRON: CPT

## 2021-04-02 PROCEDURE — 86850 RBC ANTIBODY SCREEN: CPT

## 2021-04-02 PROCEDURE — 70450 CT HEAD/BRAIN W/O DYE: CPT

## 2021-04-02 PROCEDURE — 73501 X-RAY EXAM HIP UNI 1 VIEW: CPT

## 2021-04-02 PROCEDURE — 82607 VITAMIN B-12: CPT

## 2021-04-02 PROCEDURE — 99233 SBSQ HOSP IP/OBS HIGH 50: CPT

## 2021-04-02 PROCEDURE — 72125 CT NECK SPINE W/O DYE: CPT

## 2021-04-02 PROCEDURE — 86901 BLOOD TYPING SEROLOGIC RH(D): CPT

## 2021-04-02 PROCEDURE — 82746 ASSAY OF FOLIC ACID SERUM: CPT

## 2021-04-02 PROCEDURE — 84484 ASSAY OF TROPONIN QUANT: CPT

## 2021-04-02 PROCEDURE — 71045 X-RAY EXAM CHEST 1 VIEW: CPT

## 2021-04-02 PROCEDURE — 85025 COMPLETE CBC W/AUTO DIFF WBC: CPT

## 2021-04-02 PROCEDURE — 82272 OCCULT BLD FECES 1-3 TESTS: CPT

## 2021-04-02 PROCEDURE — 80053 COMPREHEN METABOLIC PANEL: CPT

## 2021-04-02 PROCEDURE — 86900 BLOOD TYPING SEROLOGIC ABO: CPT

## 2021-04-02 PROCEDURE — 94640 AIRWAY INHALATION TREATMENT: CPT

## 2021-04-02 PROCEDURE — 36415 COLL VENOUS BLD VENIPUNCTURE: CPT

## 2021-04-02 PROCEDURE — 80307 DRUG TEST PRSMV CHEM ANLYZR: CPT

## 2021-04-02 PROCEDURE — 83735 ASSAY OF MAGNESIUM: CPT

## 2021-04-02 PROCEDURE — 84100 ASSAY OF PHOSPHORUS: CPT

## 2021-04-02 PROCEDURE — 80048 BASIC METABOLIC PNL TOTAL CA: CPT

## 2021-04-02 PROCEDURE — 83036 HEMOGLOBIN GLYCOSYLATED A1C: CPT

## 2021-04-02 PROCEDURE — 73030 X-RAY EXAM OF SHOULDER: CPT

## 2021-04-02 PROCEDURE — 73080 X-RAY EXAM OF ELBOW: CPT

## 2021-04-02 PROCEDURE — 85730 THROMBOPLASTIN TIME PARTIAL: CPT

## 2021-04-02 PROCEDURE — 83550 IRON BINDING TEST: CPT

## 2021-04-02 PROCEDURE — 86923 COMPATIBILITY TEST ELECTRIC: CPT

## 2021-04-02 PROCEDURE — P9040: CPT

## 2021-04-02 RX ORDER — CHOLECALCIFEROL (VITAMIN D3) 125 MCG
400 CAPSULE ORAL
Qty: 30 | Refills: 0
Start: 2021-04-02 | End: 2021-05-01

## 2021-04-02 RX ORDER — CALCIUM ACETATE 667 MG
1 TABLET ORAL
Qty: 30 | Refills: 0
Start: 2021-04-02 | End: 2021-05-01

## 2021-04-02 RX ORDER — CHOLECALCIFEROL (VITAMIN D3) 125 MCG
400 CAPSULE ORAL DAILY
Refills: 0 | Status: DISCONTINUED | OUTPATIENT
Start: 2021-04-02 | End: 2021-04-02

## 2021-04-02 RX ORDER — MELOXICAM 15 MG/1
1 TABLET ORAL
Qty: 0 | Refills: 0 | DISCHARGE

## 2021-04-02 RX ORDER — ACETAMINOPHEN 500 MG
1000 TABLET ORAL EVERY 8 HOURS
Refills: 0 | Status: DISCONTINUED | OUTPATIENT
Start: 2021-04-02 | End: 2021-04-02

## 2021-04-02 RX ORDER — CALCIUM ACETATE 667 MG
667 TABLET ORAL
Refills: 0 | Status: DISCONTINUED | OUTPATIENT
Start: 2021-04-02 | End: 2021-04-02

## 2021-04-02 RX ORDER — MELOXICAM 15 MG/1
1 TABLET ORAL
Qty: 14 | Refills: 0
Start: 2021-04-02 | End: 2021-04-15

## 2021-04-02 RX ADMIN — LIDOCAINE 1 PATCH: 4 CREAM TOPICAL at 00:48

## 2021-04-02 RX ADMIN — PANTOPRAZOLE SODIUM 40 MILLIGRAM(S): 20 TABLET, DELAYED RELEASE ORAL at 05:27

## 2021-04-02 RX ADMIN — Medication 400 UNIT(S): at 13:01

## 2021-04-02 RX ADMIN — LIDOCAINE 1 PATCH: 4 CREAM TOPICAL at 11:34

## 2021-04-02 RX ADMIN — PANTOPRAZOLE SODIUM 40 MILLIGRAM(S): 20 TABLET, DELAYED RELEASE ORAL at 17:19

## 2021-04-02 RX ADMIN — Medication 1000 MILLIGRAM(S): at 13:01

## 2021-04-02 RX ADMIN — Medication 667 MILLIGRAM(S): at 13:01

## 2021-04-02 RX ADMIN — Medication 1000 MILLIGRAM(S): at 14:39

## 2021-04-02 NOTE — CONSULT NOTE ADULT - NEGATIVE GASTROINTESTINAL SYMPTOMS
no nausea/no vomiting/no diarrhea/no constipation/no abdominal pain/no melena/no hematochezia/no steatorrhea/no jaundice

## 2021-04-02 NOTE — DISCHARGE NOTE PROVIDER - HOSPITAL COURSE
Patient is 70 year old female from home with PMH of PUD, anemia, asthma, arthritis and osteoporosis, who presented to ED s/p fall c/o pain in left hip/ pubic area   In ED found to have Hg of  5.8. FOBT negative. Troponin negative x1. BAL negative. CT head showing mild anterior periventricular white matter ischemia. Global atrophy. Cervical spine negative for vertebral fracture. Received 1 unit of PRBC in ED.  Hip xray noted to have left pubic rami fracture. Admitted for symptomatic anemia and pelvic fracture. Ortho consulted with recommendation for conservative management, pain management and daily PT.        --------------------incomplete--------------------------     Patient is 70 year old female from home with PMH of PUD, anemia, asthma, arthritis and osteoporosis, who presented to ED s/p fall c/o pain in left hip/ pubic area   In ED found to have Hg of  5.8. FOBT negative. Troponin negative x1. BAL negative. CT head showing mild anterior periventricular white matter ischemia. Global atrophy. Cervical spine negative for vertebral fracture. Received 1 unit of PRBC in ED.  Hip xray noted to have left pubic rami fracture. Admitted for symptomatic anemia and pelvic fracture. Ortho consulted with recommendation for conservative management, pain management and daily PT. Patient evaluated by PT with recommendation for home physical therapy. SW consulted.        Given patient's improved clinical status and current hemodynamic stability, decision was made to discharge.  Please refer to patient's complete medical chart with documents for a full hospital course, for this is only a brief summary.

## 2021-04-02 NOTE — DISCHARGE NOTE PROVIDER - CARE PROVIDER_API CALL
CORI GODOY  Internal Medicine  [address], [address] [address]  Phone: [phone]  Fax: [phone]  Follow Up Time: 1 week    Andres Elizabeth)  Orthopaedic Surgery  [address] [facility], [address] [place] [address]  Phone: [phone]  Fax: [phone]  Follow Up Time: 1 week

## 2021-04-02 NOTE — SBIRT NOTE ADULT - NSSBIRTALCNOACTINTDET_GEN_A_CORE
Patient reports prior to intermediate and when she was employed she would attend parties and drink frequently with friends. patient reporting it was not a problem for her to stop and has not had a drink in 5 years since intermediate.

## 2021-04-02 NOTE — DISCHARGE NOTE NURSING/CASE MANAGEMENT/SOCIAL WORK - PATIENT PORTAL LINK FT
You can access the FollowMyHealth Patient Portal offered by [facility] by registering at the following website: [email or web address]. By joining [facility]’s FollowMyHealth portal, you will also be able to view your health information using other applications (apps) compatible with our system.

## 2021-04-02 NOTE — DISCHARGE NOTE PROVIDER - NSDCMRMEDTOKEN_GEN_ALL_CORE_FT
Advair Diskus 250 mcg-50 mcg inhalation powder: 1 puff(s) inhaled 2 times a day  meloxicam 15 mg oral tablet: 1 tab(s) orally once a day  Naprosyn:   omeprazole 40 mg oral delayed release capsule: 1 cap(s) orally once a day  traZODone 50 mg oral tablet: 1 tab(s) orally once a day, As Needed   Advair Diskus 250 mcg-50 mcg inhalation powder: 1 puff(s) inhaled 2 times a day  calcium acetate 667 mg oral tablet: 1 tab(s) orally once a day   cholecalciferol oral tablet: 400 unit(s) orally once a day  meloxicam 15 mg oral tablet: 1 tab(s) orally once a day  Naprosyn:   omeprazole 40 mg oral delayed release capsule: 1 cap(s) orally once a day  traZODone 50 mg oral tablet: 1 tab(s) orally once a day, As Needed   Advair Diskus 250 mcg-50 mcg inhalation powder: 1 puff(s) inhaled 2 times a day  calcium acetate 667 mg oral tablet: 1 tab(s) orally once a day   cholecalciferol oral tablet: 400 unit(s) orally once a day  meloxicam 15 mg oral tablet: 1 tab(s) orally once a day  omeprazole 40 mg oral delayed release capsule: 1 cap(s) orally once a day  traZODone 50 mg oral tablet: 1 tab(s) orally once a day, As Needed

## 2021-04-02 NOTE — DISCHARGE NOTE PROVIDER - NSDCCPCAREPLAN_GEN_ALL_CORE_FT
PRINCIPAL DISCHARGE DIAGNOSIS  Diagnosis: Pelvic fracture  Assessment and Plan of Treatment: Pelvic Fracture(s)  A rpelvic fracture is a break or crack in one of the pelvic bones. A broken or cracked pelvic bone is often painful, but most do not cause other problems and heal on their own over time. Symptoms include pain when you walk or pain when pressing over the area. Physical therapy with mild exercises and pain management are recommended for faster recovery.  SEEK IMMEDIATE MEDICAL CARE IF YOU HAVE ANY OF THE FOLLOWING SYMPTOMS: abdominal pain, nausea or vomiting, pain not controlled with medications.        SECONDARY DISCHARGE DIAGNOSES  Diagnosis: Anemia due to other cause  Assessment and Plan of Treatment: Symptoms to report, bleeding, palpitations, fatigue, pale skin, cold skin, dizziness. Take medications as ordered by PCP

## 2021-04-02 NOTE — PROGRESS NOTE ADULT - ASSESSMENT
Confidential Drug Utilization Report  Search Terms: jesika rosario, 1950Search Date: 04/02/2021 11:43:33 AM  The Drug Utilization Report below displays all of the controlled substance prescriptions, if any, that your patient has filled in the last twelve months. The information displayed on this report is compiled from pharmacy submissions to the Department, and accurately reflects the information as submitted by the pharmacies.    This report was requested by: Yani Calle | Reference #: [id number]    There are no results for the search terms that you entered.
Patient is a 70 year old female from home AAO x3, with PMH of PUD, anemia, asthma, arthritis and osteoporosis, who presented to the ED due to fall. Patient states that on Saturday, she was at a party and drank some alcohol and was sitting for a while and then when she got up, she felt cramps in her legs and lost her balance and had fallen mainly on to her left side. Patient denies any LOC or head trauma. Patient admitted to medicine for symptomatic anemia and pelvic fracture. Patient received 2U PRBC in ED for H&H 5.8/22.1 now improved to 8.5/27.0. Patient CT head and c-spine negative for acute findings, X-ray hip and pelvis revels old appearing bilateral ischio pubic rami fracture. Ortho consulted with recommendation for PT and pain management.    Patient seen and examined at bedside. Currently patient denies any chest pain, SOB, melena, hematemesis, nausea, vomiting, abdominal pain or diarrhea.   
70 year old female from home with PMH of PUD, anemia, asthma, arthritis and osteoporosis, who presented to ED sp fall co pain in left hip/ pubic area   In ED found to have Hg of  5.8. FOBT negative. Troponin negative x1. BAL negative. CT head showing mild anterior periventricular white matter ischemia. Global atrophy. Cervical spine negative for vertebral fracture. Received 1 unit of PRBC in ED.  Hip xray noted to have left pubic rami fracture.  Admitted for symptomatic anemia and pelvic fracture   Pt seen at bedside, states is feeling much better, co mild pain to left pubic area radiating to left hip. No chest pain, dizziness, or difficulties breathing   CBC after first unit improved to 6.7. Received the second unit of blood.

## 2021-04-02 NOTE — DISCHARGE NOTE PROVIDER - PROVIDER TOKENS
PROVIDER:[TOKEN:[[id number]],FOLLOWUP:[1 week]],PROVIDER:[TOKEN:[[id number]],FOLLOWUP:[1 week]]

## 2021-04-02 NOTE — CONSULT NOTE ADULT - NEGATIVE ENMT SYMPTOMS
no ear pain/no tinnitus/no vertigo/no nose bleeds/no gum bleeding/no dry mouth/no throat pain/no dysphagia

## 2021-04-02 NOTE — CONSULT NOTE ADULT - SUBJECTIVE AND OBJECTIVE BOX
Pt Name: TY MARTINEZ  MRN: [id number]    ORTHOPEDIC CONSULT:    Orthopedic diagnosis:    70yFemaleHPI:  Patient is a 70 year old female from home AAO x3, with PMH of PUD, anemia, asthma, arthritis and osteoporosis, who presented to the ED due to fall. Patient states that on Saturday, she was at a party and drank some alcohol and was sitting for a while and then when she got up, she felt cramps in her legs and lost her balance and had fallen mainly on to her left side. Patient denies any LOC or head trauma. Patient has been having some pain in left pubic area when walking. Spoke to patient's son, Willem ([phone]) for further history. Son states that patient had fallen while at a party. Son also states that he believes the fall could also have been due to anemia. Son states patient has history of anemia and PUD noted on EGD about two years ago. States that patient has been eating ice chips a lot more recently and is likely due to anemia. Patient has received blood transfusions in the past. Currently patient denies any chest pain, SOB, melena, hematemesis, nausea, vomiting, abdominal pain or diarrhea. (31 Mar 2021 23:58)    poor history from patient, history from chart    AMBULATION: Baseline Ambulation [x] independent [ ] With Cane [ ] With Walker [ ]  Bedbound [ ] Pivot transfers to Wheelchair only    PAST MEDICAL & SURGICAL HISTORY:  Peptic ulcer disease    Anemia    Asthma    No significant past surgical history        ALLERGIES: dust (Unknown)  ibuprofen (Rash)  latex (Rash)  shellfish (Unknown)      MEDICATIONS: acetaminophen   Tablet .. 650 milliGRAM(s) Oral every 6 hours PRN  ALBUTerol    90 MICROgram(s) HFA Inhaler 2 Puff(s) Inhalation every 6 hours PRN  diphenhydrAMINE   Injectable 25 milliGRAM(s) IV Push every 6 hours PRN  lidocaine   Patch 1 Patch Transdermal daily  ondansetron Injectable 4 milliGRAM(s) IV Push every 6 hours PRN  pantoprazole  Injectable 40 milliGRAM(s) IV Push two times a day  polyethylene glycol 3350 17 Gram(s) Oral daily PRN  senna 2 Tablet(s) Oral at bedtime      PHYSICAL EXAM:    Vital Signs Last 24 Hrs  T(C): 36.6 (01 Apr 2021 08:51), Max: 37.2 (31 Mar 2021 17:48)  T(F): 97.9 (01 Apr 2021 08:51), Max: 98.9 (31 Mar 2021 17:48)  HR: 80 (01 Apr 2021 08:51) (66 - 96)  BP: 124/53 (01 Apr 2021 08:51) (124/53 - 142/81)  BP(mean): --  RR: 16 (01 Apr 2021 08:51) (12 - 18)  SpO2: 98% (01 Apr 2021 08:51) (96% - 99%)    Gen: well developed, well nourished, comfortable  Rectal: deferred  Extremities: no clubbing/cyanosis, no edema, no calf tenderness  Vascular:  DP/PT 2+ b/l  Neurological: no focal deficits  Skin: no rash on visible skin  Musculoskeletal:      L groin pain, pain to compression hip mild  motor sensory grossly intact    LABS:                        6.5    5.18  )-----------( 384      ( 01 Apr 2021 06:31 )             23.2     04-01    141  |  111<H>  |  13  ----------------------------<  85  4.9   |  25  |  0.49<L>    Ca    7.8<L>      01 Apr 2021 06:31  Phos  2.9     04-01  Mg     2.1     04-01    TPro  6.4  /  Alb  3.4<L>  /  TBili  0.4  /  DBili  x   /  AST  27  /  ALT  29  /  AlkPhos  92  03-31    PT/INR - ( 01 Apr 2021 06:31 )   PT: 12.5 sec;   INR: 1.05 ratio         PTT - ( 01 Apr 2021 06:31 )  PTT:24.7 sec    RADIOLOGY: L inf sup rami fx    IMPRESSION: Pt is a  70y Female with L inf sup rami fx stable pelvic fx    PLAN:  -  Pain management  -  DVT prophylaxis  -  Daily PT  -  WBAT w assistive devices PRN
[  ] STAT REQUEST              [  ]ROUTINE REQUEST    Patient is a 70 year old female with symptomatic sever iron deficiency anemia. GI consulted to evaluate.       HPI:  Patient is a 70 year old female from home AAO x3, with past medical history of PUD, asthma, arthritis and osteoporosis, who presented to the ED due to fall. Patient states that on Saturday, she was at a party and drank some alcohol and was sitting for a while and then when she got up, she felt cramps in her legs and lost her balance and had fallen mainly on to her left side.  On admission patient found to have iron deficiency anemia. Patient denies abdominal pain, nausea, vomiting, hematemesis, hematochezia melena, fever, chills, chest pain, SOB, cough, hematuria or diarrhea.    PAIN MANAGEMENT:  Pain Scale:                 0/10  Pain Location:      Prior Colonoscopy:  Unknown    PAST MEDICAL HISTORY  Peptic ulcer disease  Anemia  Asthma  Atrthritis  Osteoprosis      PAST SURGICAL HISTORY  No significant past surgical history        Allergies    dust (Unknown)  ibuprofen (Rash)  latex (Rash)  shellfish (Unknown)    Intolerances  None       MEDICATIONS  (STANDING):  acetaminophen   Tablet .. 1000 milliGRAM(s) Oral every 8 hours  calcium acetate 667 milliGRAM(s) Oral three times a day with meals  cholecalciferol 400 Unit(s) Oral daily  lidocaine   Patch 1 Patch Transdermal daily  pantoprazole  Injectable 40 milliGRAM(s) IV Push two times a day  senna 2 Tablet(s) Oral at bedtime    MEDICATIONS  (PRN):  ALBUTerol    90 MICROgram(s) HFA Inhaler 2 Puff(s) Inhalation every 6 hours PRN Shortness of Breath and/or Wheezing  diphenhydrAMINE   Injectable 25 milliGRAM(s) IV Push every 6 hours PRN Allergy symptoms  ondansetron Injectable 4 milliGRAM(s) IV Push every 6 hours PRN Nausea and/or Vomiting  polyethylene glycol 3350 17 Gram(s) Oral daily PRN Constipation  traMADol 50 milliGRAM(s) Oral every 8 hours PRN Moderate Pain (4 - 6)      SOCIAL HISTORY  Advanced Directives:       [ X ] Full Code       [  ] DNR  Marital Status:         [  ] M      [ X ] S      [  ] D       [  ] W  Children:       [ X ] Yes      [  ] No  Occupation:        [  ] Employed       [ X ] Unemployed       [  ] Retired  Diet:       [ X ] Regular       [  ] PEG feeding          [  ] NG tube feeding  Drug Use:           [ X ] Patient denied          [  ] Yes  Alcohol:           [ X ] No             [  ] Yes (socially)         [  ] Yes (chronic)  Tobacco:           [  ] Yes           [ X ] No      FAMILY HISTORY  [ X ] Heart Disease            [X] Diabetes             [ X] HTN             [  ] Colon Cancer             [  ] Stomach Cancer              [  ] Pancreatic Cancer      VITAL SIGNS   Vital Signs Last 24 Hrs  T(C): 36.4 (02 Apr 2021 13:33), Max: 36.9 (01 Apr 2021 20:43)  T(F): 97.6 (02 Apr 2021 13:33), Max: 98.4 (01 Apr 2021 20:43)  HR: 80 (02 Apr 2021 13:33) (73 - 80)  BP: 141/84 (02 Apr 2021 13:33) (119/68 - 156/73)   RR: 16 (02 Apr 2021 13:33) (16 - 18)  SpO2: 98% (02 Apr 2021 13:33) (97% - 100%)       CBC Full  -  ( 02 Apr 2021 05:51 )  WBC Count : 6.30 K/uL  RBC Count : 4.78 M/uL  Hemoglobin : 8.5 g/dL  Hematocrit : 29.6 %  Platelet Count - Automated : 396 K/uL  Mean Cell Volume : 61.9 fl  Mean Cell Hemoglobin : 17.8 pg  Mean Cell Hemoglobin Concentration : 28.7 gm/dL  Auto Neutrophil # : x  Auto Lymphocyte # : x  Auto Monocyte # : x  Auto Eosinophil # : x  Auto Basophil # : x  Auto Neutrophil % : x  Auto Lymphocyte % : x  Auto Monocyte % : x  Auto Eosinophil % : x  Auto Basophil % : x      04-02    140  |  106  |  10  ----------------------------<  92  4.1   |  27  |  0.54    Ca    8.3<L>      02 Apr 2021 05:51  Phos  2.9     04-01  Mg     2.1     04-01    TPro  6.4  /  Alb  3.4<L>  /  TBili  0.4  /  DBili  x   /  AST  27  /  ALT  29  /  AlkPhos  92  03-31     PT/INR - ( 01 Apr 2021 06:31 )   PT: 12.5 sec;   INR: 1.05 ratio       PTT - ( 01 Apr 2021 06:31 )  PTT:24.7 sec     Occult Blood, Feces (03.31.21 @ 21:00)   Occult Blood, Feces: Negative Iron with Total Binding Capacity (04.01.21 @ 06:31)   Iron - Total Binding Capacity.: 413 ug/dL   % Saturation, Iron: 4 %   Iron Total, Serum: 18 ug/dL   Unsaturated Iron Binding Capacity: 395 ug/dL       ECG  Ventricular Rate 82 BPM    Atrial Rate 82 BPM    P-R Interval 124 ms    QRS Duration 82 ms     ms    QTc 465 ms    P Axis 40 degrees    R Axis 18 degrees    T Axis 76 degrees    Diagnosis Line Normal sinus rhythm  Nonspecific T wave abnormality  Abnormal ECG    RADIOLOGY/IMAGING                `EXAM:  CT CERVICAL SPINE                          EXAM:  CT BRAIN                            PROCEDURE DATE:  03/31/2021          INTERPRETATION:  CT head without IV contrast    CLINICAL INFORMATION:  Fall   Intracranial hemorrhage.    TECHNIQUE: Contiguous axial 5 mm sections were obtained through the head. Sagittal and coronal 2-D reformatted images were also obtained.   This scan was performed using automatic exposure control (radiation dose reduction software) to obtain a diagnostic image quality scan with patient dose as low as reasonably achievable.    FINDINGS:   CT dated 08/19/2016 available for review.    The brain demonstrates mild anterior periventricular white matter ischemia. No acute cerebral cortical infarct is seen.  No intracranial hemorrhage is found.  No mass effect is found in the brain.    The ventricles, sulci and basal cisterns show global atrophy.    The orbits are unremarkable.  The paranasal sinuses are significant for retention cyst in the RIGHT sphenoid sinus.  The nasal cavity appears intact.  The nasopharynx is symmetric.  The central skull base, petrous temporal bones and calvarium remain intact.

## 2021-04-02 NOTE — DIETITIAN INITIAL EVALUATION ADULT. - PROBLEM SELECTOR PROBLEM 1
"Anesthesia Transfer of Care Note    Patient: Jenni Prescott JrPatricia    Procedure(s) Performed: Procedure(s) (LRB):  ESOPHAGOGASTRODUODENOSCOPY (EGD) w/ duodenoscope (N/A)  COLONOSCOPY/Miralax Split (N/A)    Patient location: GI    Anesthesia Type: MAC    Transport from OR: Transported from OR on room air with adequate spontaneous ventilation    Post pain: adequate analgesia    Post assessment: no apparent anesthetic complications and tolerated procedure well    Post vital signs: stable    Level of consciousness: responds to stimulation and sedated    Nausea/Vomiting: no nausea/vomiting    Complications: none    Transfer of care protocol was followed      Last vitals:   Visit Vitals  /85 (Patient Position: Lying)   Pulse (!) 53   Temp 36.8 °C (98.3 °F) (Oral)   Resp 17   Ht 5' 10" (1.778 m)   Wt 93 kg (205 lb)   SpO2 97%   BMI 29.41 kg/m²     " Pelvic fracture

## 2021-04-02 NOTE — DIETITIAN INITIAL EVALUATION ADULT. - ADD RECOMMEND
add multivitamin/minerals 1 tab/d and vitamin C 500mg / d to assist with healing , patient will tolerate diet with improved wound healing

## 2021-04-02 NOTE — CONSULT NOTE ADULT - ASSESSMENT
1. Iron deficiency anemia  2. No evidence of acute GI bleeding  3. R/o Colorectal neoplasm  4. R/o recurrent peptic ulcer disease  5. R/o AVM's    Suggestions:    1. Monitor H/H  2. Transfuse PRBC as needed  3. Protonix 40mg daily  4. CT-Scan of abdomen and pelvis  5. EGD and colonoscopy  6. Avoid NSAID  7. DVT prophylaxis

## 2021-04-02 NOTE — PROGRESS NOTE ADULT - PROBLEM SELECTOR PLAN 2
patient presented after fall at home  noted to have left pubic rami fracture   ortho consulted  conservative measures   pain control  PT eval.
baseline around 7   Hgb of 5.8 received one unit PRBC --> 6.5   sp 2nd unit PRBC   denies any melena or hematemesis  FOBT negative  may be from PUD; NSAID use   GI, Dr. Tan consulted for further recommendations   monitor CBC daily   protonix IV BID

## 2021-04-02 NOTE — CONSULT NOTE ADULT - NEGATIVE NEUROLOGICAL SYMPTOMS
no syncope/no tremors/no vertigo/no loss of sensation/no difficulty walking/no headache/no confusion

## 2021-04-02 NOTE — PROGRESS NOTE ADULT - PROBLEM SELECTOR PLAN 1
patient presented after fall at home  noted to have left pubic rami fracture   ortho consulted  conservative measures   pain control  PT eval
baseline around 7   Hgb of 5.8 received one unit PRBC --> 6.5   sp 2 units PRBC in ED  denies any melena or hematemesis  FOBT negative  may be from PUD; NSAID use   GI, Dr. Tan consulted for further recommendations   monitor CBC daily   protonix IV BID.
- pt s/p fall.  + left pubic rami fracture.  + anemia.  Pt most likely has osteoporosis.  Would supplement with vitamin d and calcium.  Bone density testing as oupt.  nonopioid recommendation  - acetaminophen 1 gram po q 8 hours atc for 3 days  - no nsaids  - lidocaine patch daily   opioid recommendation  - tramadol 50mg po q 8 hours prn   bowel regimen  - senna and miralax

## 2021-04-02 NOTE — DIETITIAN INITIAL EVALUATION ADULT. - PROBLEM SELECTOR PLAN 2
patient noted to have Hgb of 5.8 in ED  patient has some ecchymosis of LUE  denies any melena or hematemesis  FOBT negative  may be from PUD; NSAID use   1 PRBC given in ED  patient states her baseline Hgb is around 7  morning Hgb of 6.5; will order 2nd PRBC  GI, Dr. Tan consulted  monitor CBC daily   protonix IV BID

## 2021-04-02 NOTE — PROGRESS NOTE ADULT - PROBLEM SELECTOR PLAN 4
patient with history of PUD   may be cause of anemia   protonix IV BID  avoid NSAIDs.
albuterol PRN  monitor oxygen saturation

## 2021-04-02 NOTE — DIETITIAN INITIAL EVALUATION ADULT. - PERTINENT LABORATORY DATA
04-02 Na140 mmol/L Glu 92 mg/dL K+ 4.1 mmol/L Cr  0.54 mg/dL BUN 10 mg/dL 04-01 Phos 2.9 mg/dL 03-31 Alb 3.4 g/dL<L> 04-01 Chol 157 mg/dL LDL --    HDL 79 mg/dL Trig 66 mg/dL

## 2021-04-02 NOTE — CONSULT NOTE ADULT - NEGATIVE OPHTHALMOLOGIC SYMPTOMS
no diplopia/no photophobia/no lacrimation L/no lacrimation R/no blurred vision L/no blurred vision R/no discharge L/no discharge R/no pain R/no irritation L/no irritation R/no loss of vision L/no loss of vision R/no scleral injection L/no scleral injection R

## 2021-04-02 NOTE — PROGRESS NOTE ADULT - SUBJECTIVE AND OBJECTIVE BOX
NP Note discussed with Primary Attending.        Patient is a 70y old  Female who presents with a chief complaint of fall; anemia (02 Apr 2021 11:50)      INTERVAL HPI/OVERNIGHT EVENTS: no new complaints    MEDICATIONS  (STANDING):  acetaminophen   Tablet .. 1000 milliGRAM(s) Oral every 8 hours  calcium acetate 667 milliGRAM(s) Oral three times a day with meals  cholecalciferol 400 Unit(s) Oral daily  lidocaine   Patch 1 Patch Transdermal daily  pantoprazole  Injectable 40 milliGRAM(s) IV Push two times a day  senna 2 Tablet(s) Oral at bedtime    MEDICATIONS  (PRN):  ALBUTerol    90 MICROgram(s) HFA Inhaler 2 Puff(s) Inhalation every 6 hours PRN Shortness of Breath and/or Wheezing  diphenhydrAMINE   Injectable 25 milliGRAM(s) IV Push every 6 hours PRN Allergy symptoms  ondansetron Injectable 4 milliGRAM(s) IV Push every 6 hours PRN Nausea and/or Vomiting  polyethylene glycol 3350 17 Gram(s) Oral daily PRN Constipation  traMADol 50 milliGRAM(s) Oral every 8 hours PRN Moderate Pain (4 - 6)      __________________________________________________  REVIEW OF SYSTEMS:    CONSTITUTIONAL: No fever,   EYES: no acute visual disturbances  NECK: No pain or stiffness  RESPIRATORY: No cough; No shortness of breath  CARDIOVASCULAR: No chest pain, no palpitations  GASTROINTESTINAL: No pain. No nausea or vomiting; No diarrhea   NEUROLOGICAL: No headache or numbness, no tremors  MUSCULOSKELETAL: No joint pain, no muscle pain  GENITOURINARY: no dysuria, no frequency, no hesitancy  PSYCHIATRY: no depression , no anxiety  ALL OTHER  ROS negative        Vital Signs Last 24 Hrs  T(C): 36.8 (02 Apr 2021 05:14), Max: 37.2 (01 Apr 2021 13:15)  T(F): 98.2 (02 Apr 2021 05:14), Max: 98.9 (01 Apr 2021 13:15)  HR: 73 (02 Apr 2021 05:14) (67 - 85)  BP: 152/79 (02 Apr 2021 05:14) (130/65 - 156/73)  BP(mean): --  RR: 18 (02 Apr 2021 05:14) (16 - 18)  SpO2: 97% (02 Apr 2021 05:14) (97% - 100%)    ________________________________________________  PHYSICAL EXAM:  GENERAL: NAD  HEENT: Normocephalic;  conjunctivae and sclerae clear; moist mucous membranes;   NECK : supple  CHEST/LUNG: Clear to auscultation bilaterally with good air entry   HEART: S1 S2  regular; no murmurs, gallops or rubs  ABDOMEN: Soft, Nontender, Nondistended; Bowel sounds present  EXTREMITIES: no cyanosis; no edema; no calf tenderness  SKIN: warm and dry; no rash  NERVOUS SYSTEM:  Awake and alert; Oriented  to place, person and time ; no new deficits    _________________________________________________  LABS:                        8.5    6.30  )-----------( 396      ( 02 Apr 2021 05:51 )             29.6     04-02    140  |  106  |  10  ----------------------------<  92  4.1   |  27  |  0.54    Ca    8.3<L>      02 Apr 2021 05:51  Phos  2.9     04-01  Mg     2.1     04-01    TPro  6.4  /  Alb  3.4<L>  /  TBili  0.4  /  DBili  x   /  AST  27  /  ALT  29  /  AlkPhos  92  03-31    PT/INR - ( 01 Apr 2021 06:31 )   PT: 12.5 sec;   INR: 1.05 ratio         PTT - ( 01 Apr 2021 06:31 )  PTT:24.7 sec    CAPILLARY BLOOD GLUCOSE            RADIOLOGY & ADDITIONAL TESTS:    Imaging  Reviewed:  YES/NO    Consultant(s) Notes Reviewed:   YES/ No      Plan of care was discussed with patient and /or primary care giver; all questions and concerns were addressed 
NP Note discussed with  primary attending    Patient is a 70y old  Female who presents with a chief complaint of fall; anemia (01 Apr 2021 11:07)      INTERVAL HPI/OVERNIGHT EVENTS: no new complaints    MEDICATIONS  (STANDING):  lidocaine   Patch 1 Patch Transdermal daily  pantoprazole  Injectable 40 milliGRAM(s) IV Push two times a day  senna 2 Tablet(s) Oral at bedtime    MEDICATIONS  (PRN):  acetaminophen   Tablet .. 650 milliGRAM(s) Oral every 6 hours PRN Temp greater or equal to 38C (100.4F), Moderate Pain (4 - 6)  ALBUTerol    90 MICROgram(s) HFA Inhaler 2 Puff(s) Inhalation every 6 hours PRN Shortness of Breath and/or Wheezing  diphenhydrAMINE   Injectable 25 milliGRAM(s) IV Push every 6 hours PRN Allergy symptoms  ondansetron Injectable 4 milliGRAM(s) IV Push every 6 hours PRN Nausea and/or Vomiting  polyethylene glycol 3350 17 Gram(s) Oral daily PRN Constipation  traMADol 50 milliGRAM(s) Oral every 8 hours PRN Moderate Pain (4 - 6)      __________________________________________________  REVIEW OF SYSTEMS:    CONSTITUTIONAL: No fever,   EYES: no acute visual disturbances  NECK: No pain or stiffness  RESPIRATORY: No cough; No shortness of breath  CARDIOVASCULAR: No chest pain, no palpitations  GASTROINTESTINAL: No pain. No nausea or vomiting; No diarrhea   NEUROLOGICAL: No headache or numbness, no tremors  MUSCULOSKELETAL: left pelvis pain radiating to left hip , no muscle pain  GENITOURINARY: no dysuria, no frequency, no hesitancy  PSYCHIATRY: no depression , no anxiety  ALL OTHER  ROS negative        Vital Signs Last 24 Hrs  T(C): 36.7 (01 Apr 2021 14:04), Max: 37.2 (31 Mar 2021 17:48)  T(F): 98.1 (01 Apr 2021 14:04), Max: 98.9 (31 Mar 2021 17:48)  HR: 67 (01 Apr 2021 14:04) (66 - 96)  BP: 143/65 (01 Apr 2021 14:04) (124/53 - 143/65)  BP(mean): --  RR: 16 (01 Apr 2021 14:04) (12 - 18)  SpO2: 97% (01 Apr 2021 14:04) (96% - 99%)    ________________________________________________  PHYSICAL EXAM:  GENERAL: NAD, obese   CHEST/LUNG: Clear to ausculitation bilaterally with good air entry   HEART: S1 S2  regular; no murmurs, gallops or rubs  ABDOMEN: Soft, Nontender, Nondistended; Bowel sounds present  EXTREMITIES: no cyanosis; no edema; no calf tenderness  SKIN: warm and dry; no rash, ecchymosis to left arm   NERVOUS SYSTEM:  Awake and alert; Oriented  to place, person and time ; no new deficits    _________________________________________________  LABS:                        6.5    5.18  )-----------( 384      ( 01 Apr 2021 06:31 )             23.2     04-01    141  |  111<H>  |  13  ----------------------------<  85  4.9   |  25  |  0.49<L>    Ca    7.8<L>      01 Apr 2021 06:31  Phos  2.9     04-01  Mg     2.1     04-01    TPro  6.4  /  Alb  3.4<L>  /  TBili  0.4  /  DBili  x   /  AST  27  /  ALT  29  /  AlkPhos  92  03-31    PT/INR - ( 01 Apr 2021 06:31 )   PT: 12.5 sec;   INR: 1.05 ratio         PTT - ( 01 Apr 2021 06:31 )  PTT:24.7 sec    CAPILLARY BLOOD GLUCOSE            RADIOLOGY & ADDITIONAL TESTS:    Imaging Personally Reviewed:  YES/NO    Consultant(s) Notes Reviewed:   YES/ No    Care Discussed with Consultants :     Plan of care was discussed with patient and /or primary care giver; all questions and concerns were addressed and care was aligned with patient's wishes.    
Source of information: , Chart review  Patient language: English  : n/a    CC:  left hip pain    This is a 70yFemale patient who presents to the hospital for Patient is a 70y old  Female who presents with a chief complaint of fall; anemia (01 Apr 2021 17:14)    Pt s/p fall.  + Appearing medial left superior pubic ramus fracture. + old right pubic rami fracture.  + left hip pain. Pain increases on exertion  No nausea or vomiting.  Pt is oob and ambulating with walker.  + anemia - required blood transfusions    PAIN SCORE:       4/10  SCALE USED: (1-10 NRS)      PAST MEDICAL & SURGICAL HISTORY:  Peptic ulcer disease    Anemia    Asthma    No significant past surgical history        FAMILY HISTORY:        SOCIAL HISTORY:  [ x] Denies Smoking, Alcohol, or Drug Use    Allergies    dust (Unknown)  ibuprofen (Rash)  latex (Rash)  shellfish (Unknown)    Intolerances        MEDICATIONS:    MEDICATIONS  (STANDING):  lidocaine   Patch 1 Patch Transdermal daily  pantoprazole  Injectable 40 milliGRAM(s) IV Push two times a day  senna 2 Tablet(s) Oral at bedtime    MEDICATIONS  (PRN):  acetaminophen   Tablet .. 650 milliGRAM(s) Oral every 6 hours PRN Temp greater or equal to 38C (100.4F), Moderate Pain (4 - 6)  ALBUTerol    90 MICROgram(s) HFA Inhaler 2 Puff(s) Inhalation every 6 hours PRN Shortness of Breath and/or Wheezing  diphenhydrAMINE   Injectable 25 milliGRAM(s) IV Push every 6 hours PRN Allergy symptoms  ondansetron Injectable 4 milliGRAM(s) IV Push every 6 hours PRN Nausea and/or Vomiting  polyethylene glycol 3350 17 Gram(s) Oral daily PRN Constipation  traMADol 50 milliGRAM(s) Oral every 8 hours PRN Moderate Pain (4 - 6)      Vital Signs Last 24 Hrs  T(C): 36.8 (02 Apr 2021 05:14), Max: 37.2 (01 Apr 2021 13:15)  T(F): 98.2 (02 Apr 2021 05:14), Max: 98.9 (01 Apr 2021 13:15)  HR: 73 (02 Apr 2021 05:14) (67 - 85)  BP: 152/79 (02 Apr 2021 05:14) (130/65 - 156/73)  BP(mean): --  RR: 18 (02 Apr 2021 05:14) (16 - 18)  SpO2: 97% (02 Apr 2021 05:14) (97% - 100%)    LABS:                          8.5    6.30  )-----------( 396      ( 02 Apr 2021 05:51 )             29.6     04-02    140  |  106  |  10  ----------------------------<  92  4.1   |  27  |  0.54    Ca    8.3<L>      02 Apr 2021 05:51  Phos  2.9     04-01  Mg     2.1     04-01    TPro  6.4  /  Alb  3.4<L>  /  TBili  0.4  /  DBili  x   /  AST  27  /  ALT  29  /  AlkPhos  92  03-31    PT/INR - ( 01 Apr 2021 06:31 )   PT: 12.5 sec;   INR: 1.05 ratio         PTT - ( 01 Apr 2021 06:31 )  PTT:24.7 sec  LIVER FUNCTIONS - ( 31 Mar 2021 18:55 )  Alb: 3.4 g/dL / Pro: 6.4 g/dL / ALK PHOS: 92 U/L / ALT: 29 U/L DA / AST: 27 U/L / GGT: x             CAPILLARY BLOOD GLUCOSE          Radiology:  < from: Xray Hip w/ Pelvis 1 View, Left (03.31.21 @ 20:03) >    EXAM:  XR HIP WITH PELV 1V LT                            PROCEDURE DATE:  03/31/2021          INTERPRETATION:  Left hip with full pelvic view. Patient has 3 days of pain after a fall. 3 views.    There is a right iliac stent new since CAT scan of March 28, 2017.    Hips are relatively free of degeneration and are symmetric.    The CAT scan showed a nondisplaced right ischio pubic ramus fracture that has healed with exuberant new bone formation.    There is a similar fracture suggesting old etiology of the left ischio pubic ramus.    There is another fracture of the medial superior left pubic ramus which may well be acute. Correlation recommended.    IMPRESSION: Old appearing bilateral ischio pubic rami fracture.      Appearing medial left superior pubic ramus fracture. There is a new right iliac stent.    < end of copied text >      Drug Screen:        REVIEW OF SYSTEMS:  CONSTITUTIONAL: No fever or fatigue  RESPIRATORY: No cough, wheezing, chills or hemoptysis; No shortness of breath  CARDIOVASCULAR: No chest pain, palpitations, dizziness, or leg swelling  GASTROINTESTINAL: No abdominal or epigastric pain. No nausea, vomiting; No diarrhea or constipation.   GENITOURINARY: No dysuria, frequency, hematuria, retention or incontinence  MUSCULOSKELETAL: + left hip pain  NEURO: No headaches, No numbness/tingling b/l LE, No weakness  PSYCHIATRIC: No depression, anxiety, mood swings, or difficulty sleeping    PHYSICAL EXAM:  GENERAL:  Alert & Oriented X3, NAD, Good concentration  CHEST/LUNG: Clear to auscultation bilaterally; No rales, rhonchi, wheezing, or rubs  HEART: Regular rate and rhythm; No murmurs, rubs, or gallops  ABDOMEN: Soft, Nontender, Nondistended; Bowel sounds present  EXTREMITIES:  2+ Peripheral Pulses, No cyanosis, or edema  MUSCULOSKELETAL: + decreased rom + left hip tenderness + left groin pain  SKIN: + bruising skin    Risk factors associated with adverse outcomes related to opioid treatment  [ ]  Concurrent benzodiazepine use  [ ]  History/ Active substance use or alcohol use disorder  [ ] Psychiatric co-morbidity  [ ] Sleep apnea  [ ] COPD  [ ] BMI> 35  [ ] Liver dysfunction  [ ] Renal dysfunction  [ ] CHF  [ ] Smoker  [x ]  Age > 60 years    [x ]  [place]  Reviewed and Copied to Chart. See below.    Plan of care and goal oriented pain management treatment options were discussed with patient and /or primary care giver; all questions and concerns were addressed and care was aligned with patient's wishes.    Educated patient on goal oriented pain management treatment options     04-02-21 @ 11:32

## 2021-04-02 NOTE — DIETITIAN INITIAL EVALUATION ADULT. - OTHER INFO
Reports No weight loss PTA. Reports having lost her appetite in-house too. No complaints of Nausea, vomiting or diarrhea. No problems with chewing or swallowing food . allergy: shellfish, communicated with kitchen.  skin- L buttocks, stage 2. On skin ulcer protocol. add multivitamin/minerals 1 tab/d and vitamin C 500mg/ d to assist with healing . provide Regular diet as ordered

## 2021-04-02 NOTE — PROGRESS NOTE ADULT - PROBLEM SELECTOR PLAN 3
albuterol PRN  monitor oxygen saturation.
patient with history of PUD   may be cause of anemia   protonix IV BID  avoid NSAIDs

## 2022-04-06 PROBLEM — J45.909 UNSPECIFIED ASTHMA, UNCOMPLICATED: Chronic | Status: ACTIVE | Noted: 2021-04-01

## 2022-04-06 PROBLEM — K27.9 PEPTIC ULCER, SITE UNSPECIFIED, UNSPECIFIED AS ACUTE OR CHRONIC, WITHOUT HEMORRHAGE OR PERFORATION: Chronic | Status: ACTIVE | Noted: 2021-04-01

## 2022-04-06 PROBLEM — D64.9 ANEMIA, UNSPECIFIED: Chronic | Status: ACTIVE | Noted: 2021-04-01

## 2022-04-20 ENCOUNTER — OUTPATIENT (OUTPATIENT)
Dept: OUTPATIENT SERVICES | Facility: HOSPITAL | Age: 72
LOS: 1 days | End: 2022-04-20
Payer: MEDICARE

## 2022-04-20 VITALS
HEIGHT: 60 IN | WEIGHT: 136.91 LBS | OXYGEN SATURATION: 99 % | SYSTOLIC BLOOD PRESSURE: 141 MMHG | DIASTOLIC BLOOD PRESSURE: 84 MMHG | TEMPERATURE: 99 F | HEART RATE: 69 BPM | RESPIRATION RATE: 16 BRPM

## 2022-04-20 DIAGNOSIS — M17.12 UNILATERAL PRIMARY OSTEOARTHRITIS, LEFT KNEE: ICD-10-CM

## 2022-04-20 DIAGNOSIS — Z87.81 PERSONAL HISTORY OF (HEALED) TRAUMATIC FRACTURE: Chronic | ICD-10-CM

## 2022-04-20 DIAGNOSIS — J45.998 OTHER ASTHMA: ICD-10-CM

## 2022-04-20 DIAGNOSIS — Z01.818 ENCOUNTER FOR OTHER PREPROCEDURAL EXAMINATION: ICD-10-CM

## 2022-04-20 DIAGNOSIS — J45.909 UNSPECIFIED ASTHMA, UNCOMPLICATED: ICD-10-CM

## 2022-04-20 LAB — BLD GP AB SCN SERPL QL: SIGNIFICANT CHANGE UP

## 2022-04-20 PROCEDURE — G0463: CPT

## 2022-04-20 PROCEDURE — 83036 HEMOGLOBIN GLYCOSYLATED A1C: CPT

## 2022-04-20 PROCEDURE — 87641 MR-STAPH DNA AMP PROBE: CPT

## 2022-04-20 PROCEDURE — 87640 STAPH A DNA AMP PROBE: CPT

## 2022-04-20 RX ORDER — FLUTICASONE PROPIONATE AND SALMETEROL 50; 250 UG/1; UG/1
1 POWDER ORAL; RESPIRATORY (INHALATION)
Qty: 0 | Refills: 0 | DISCHARGE

## 2022-04-20 RX ORDER — OMEPRAZOLE 10 MG/1
1 CAPSULE, DELAYED RELEASE ORAL
Qty: 0 | Refills: 0 | DISCHARGE

## 2022-04-20 NOTE — H&P PST ADULT - PROBLEM SELECTOR PLAN 1
Scheduled for left total knee replacement 4/28/22.  Preoperative instructions discussed and given to patient in English and Kyrgyz  NPO after midnight  Tylenol as needed for pain   Use KAMARI HEX 4% 3 days prior to surgery as discussed

## 2022-04-20 NOTE — H&P PST ADULT - NSICDXPASTMEDICALHX_GEN_ALL_CORE_FT
PAST MEDICAL HISTORY:  Anemia     Asthma     DVT, lower extremity RLE x 2 (2017 and 2019)    GERD (gastroesophageal reflux disease)     Peptic ulcer disease     Vitamin D deficiency

## 2022-04-20 NOTE — H&P PST ADULT - NSANTHOSAYNRD_GEN_A_CORE
No. FLORINDA screening performed.  STOP BANG Legend: 0-2 = LOW Risk; 3-4 = INTERMEDIATE Risk; 5-8 = HIGH Risk

## 2022-04-20 NOTE — H&P PST ADULT - MS GEN HX ROS MEA POS PC
h/o Left knee, digits of both hands, right knee/arthritis/joint swelling/joint pain/muscle weakness/stiffness

## 2022-04-20 NOTE — H&P PST ADULT - HISTORY OF PRESENT ILLNESS
70 y/o female with h/o moderate seasonal asthma (controlled with Advair prn), GERD (diet controlled),  DVT RLE (2017 and 2019), complains of severe, sharp, left knee pain, 8/10 aggravated by walking. States the knee "cracks" Patient is diagnosed with unilateral primary osteoarthritis, left knee and is scheduled for left total knee replacement 4/28/22.

## 2022-04-20 NOTE — H&P PST ADULT - REASON FOR ADMISSION
left total knee replacement  Patient's goal for surgery is "to walk without pain and enjoy my life."

## 2022-04-20 NOTE — H&P PST ADULT - ASSESSMENT
72 y/o female with h/o moderate seasonal asthma (controlled with Advair prn), GERD (diet controlled),  DVT RLE (2017 and 2019), is diagnosed with unilateral primary osteoarthritis, left knee   STOP BANG Score is 2 - low risk for FLORINDA

## 2022-04-21 LAB
A1C WITH ESTIMATED AVERAGE GLUCOSE RESULT: 6.3 % — HIGH (ref 4–5.6)
ESTIMATED AVERAGE GLUCOSE: 134 MG/DL — HIGH (ref 68–114)
MRSA PCR RESULT.: SIGNIFICANT CHANGE UP
S AUREUS DNA NOSE QL NAA+PROBE: DETECTED

## 2022-04-22 RX ORDER — MUPIROCIN 20 MG/G
1 OINTMENT TOPICAL
Qty: 1 | Refills: 0
Start: 2022-04-22 | End: 2022-04-26

## 2022-04-25 NOTE — CHART NOTE - NSCHARTNOTEFT_GEN_A_CORE
PRE-TJR SOCIAL/FUNCTIONAL SCREENING Via:    In Person Meet  on 04/20/2022:  Preferred Language:  #[phone]  Patient Telephone #: [phone]  EMAIL ADDRESS: [email or web address]  Insurance:   BCBS  Pt stated Goal for Surgery : I want to run the world  SURGERY DETAILS:  Sx Date:  04/28/2022                                                                                           Surgery Type:  Left Knee Replacement  COVID Status: Pt. is Fully Vaccinated  // COVID test scheduled for 04/25/2022    SOCIAL HISTORY:     Live With: son in a Apartment   Stairs Required to Access (Street to Front Door) Residence:  [address] (second floor)  Once Inside Pt Residence:   To Access a Bathroom:      No Stairs Required     To Access a Bedroom Where Pt. Can Sleep:  No Stairs Required    Pt. Currently Has Formal Home Health Services:  No      MOBILITY HISTORY:   Baseline Ambulation- Pt is Independent in ambulation  with Device: cane  Pt. Owns Any Other Medical Equipment?  No  Patient will require a rolling walker and 3-in-1 commode  MEDICAL HISTORY:  Pt has any History of Back Surgery:   No    Pt has any Allergies:    Yes; patient states she has allergies to some pain meds but only sometimes. Not sure of the names of the medications.  Patient denies a diagnosis of sleep apnea and the use of CPAP    Pt. Pharmacy Information:  Name:  [facility] [employer]                   Address:    [address] [place] [address]      Telephone #: [phone]    Pt. will Require Transportation to Home Upon Discharge:  Yes;  will be Made Aware:    For Post-Acute Care:  Pt. prefers  [facility] for post acute needs HOWEVER, patient is requesting to go to rehab after procedure  Pt was provided with pre- op education book "What to do before and after knee replacement " and referred to it during Pre-op education. All questions were answered , pt. has my phone number for follow up as needed.

## 2022-04-27 RX ORDER — POVIDONE-IODINE 5 %
1 AEROSOL (ML) TOPICAL ONCE
Refills: 0 | Status: COMPLETED | OUTPATIENT
Start: 2022-04-28 | End: 2022-04-28

## 2022-04-27 RX ORDER — MUPIROCIN 20 MG/G
1 OINTMENT TOPICAL
Qty: 1 | Refills: 0
Start: 2022-04-27 | End: 2022-05-01

## 2022-04-28 ENCOUNTER — INPATIENT (INPATIENT)
Facility: HOSPITAL | Age: 72
LOS: 2 days | Discharge: NURSING, REHAB OR LONG-TERM CARE | DRG: 488 | End: 2022-05-01
Attending: ORTHOPAEDIC SURGERY | Admitting: ORTHOPAEDIC SURGERY
Payer: MEDICARE

## 2022-04-28 VITALS
RESPIRATION RATE: 16 BRPM | HEART RATE: 69 BPM | SYSTOLIC BLOOD PRESSURE: 150 MMHG | HEIGHT: 60 IN | OXYGEN SATURATION: 98 % | TEMPERATURE: 99 F | WEIGHT: 136.91 LBS | DIASTOLIC BLOOD PRESSURE: 58 MMHG

## 2022-04-28 DIAGNOSIS — M17.12 UNILATERAL PRIMARY OSTEOARTHRITIS, LEFT KNEE: ICD-10-CM

## 2022-04-28 DIAGNOSIS — Z87.81 PERSONAL HISTORY OF (HEALED) TRAUMATIC FRACTURE: Chronic | ICD-10-CM

## 2022-04-28 DIAGNOSIS — G89.18 OTHER ACUTE POSTPROCEDURAL PAIN: ICD-10-CM

## 2022-04-28 LAB
ANION GAP SERPL CALC-SCNC: 6 MMOL/L — SIGNIFICANT CHANGE UP (ref 5–17)
BLD GP AB SCN SERPL QL: SIGNIFICANT CHANGE UP
BUN SERPL-MCNC: 13 MG/DL — SIGNIFICANT CHANGE UP (ref 7–18)
CALCIUM SERPL-MCNC: 8.3 MG/DL — LOW (ref 8.4–10.5)
CHLORIDE SERPL-SCNC: 112 MMOL/L — HIGH (ref 96–108)
CO2 SERPL-SCNC: 26 MMOL/L — SIGNIFICANT CHANGE UP (ref 22–31)
CREAT SERPL-MCNC: 0.49 MG/DL — LOW (ref 0.5–1.3)
EGFR: 102 ML/MIN/1.73M2 — SIGNIFICANT CHANGE UP
GLUCOSE BLDC GLUCOMTR-MCNC: 111 MG/DL — HIGH (ref 70–99)
GLUCOSE BLDC GLUCOMTR-MCNC: 88 MG/DL — SIGNIFICANT CHANGE UP (ref 70–99)
GLUCOSE SERPL-MCNC: 76 MG/DL — SIGNIFICANT CHANGE UP (ref 70–99)
HCT VFR BLD CALC: 27.3 % — LOW (ref 34.5–45)
HGB BLD-MCNC: 7.6 G/DL — LOW (ref 11.5–15.5)
MCHC RBC-ENTMCNC: 18.9 PG — LOW (ref 27–34)
MCHC RBC-ENTMCNC: 27.8 GM/DL — LOW (ref 32–36)
MCV RBC AUTO: 67.7 FL — LOW (ref 80–100)
NRBC # BLD: 0 /100 WBCS — SIGNIFICANT CHANGE UP (ref 0–0)
PLATELET # BLD AUTO: 385 K/UL — SIGNIFICANT CHANGE UP (ref 150–400)
POTASSIUM SERPL-MCNC: 3.7 MMOL/L — SIGNIFICANT CHANGE UP (ref 3.5–5.3)
POTASSIUM SERPL-SCNC: 3.7 MMOL/L — SIGNIFICANT CHANGE UP (ref 3.5–5.3)
RBC # BLD: 4.03 M/UL — SIGNIFICANT CHANGE UP (ref 3.8–5.2)
RBC # FLD: 22 % — HIGH (ref 10.3–14.5)
SODIUM SERPL-SCNC: 144 MMOL/L — SIGNIFICANT CHANGE UP (ref 135–145)
WBC # BLD: 7.43 K/UL — SIGNIFICANT CHANGE UP (ref 3.8–10.5)
WBC # FLD AUTO: 7.43 K/UL — SIGNIFICANT CHANGE UP (ref 3.8–10.5)

## 2022-04-28 PROCEDURE — 99222 1ST HOSP IP/OBS MODERATE 55: CPT

## 2022-04-28 PROCEDURE — 88311 DECALCIFY TISSUE: CPT | Mod: 26

## 2022-04-28 PROCEDURE — 73562 X-RAY EXAM OF KNEE 3: CPT | Mod: 26,LT

## 2022-04-28 PROCEDURE — 88305 TISSUE EXAM BY PATHOLOGIST: CPT | Mod: 26

## 2022-04-28 DEVICE — PIN STRL 1/8 X 3.5IN LONG: Type: IMPLANTABLE DEVICE | Status: FUNCTIONAL

## 2022-04-28 DEVICE — INSERT TIB BEARING PS X3 SZ 2 9MM: Type: IMPLANTABLE DEVICE | Status: FUNCTIONAL

## 2022-04-28 DEVICE — BONE CEMENT ANTIBIOTIC SNGL DOSE  ONLY FOR BILL ONLYS: Type: IMPLANTABLE DEVICE | Status: FUNCTIONAL

## 2022-04-28 DEVICE — COMP PATELLA ASYMMETRIC X3 32X10MM: Type: IMPLANTABLE DEVICE | Status: FUNCTIONAL

## 2022-04-28 DEVICE — COMP FEM TRIATHLON PS SZ 2 LT: Type: IMPLANTABLE DEVICE | Status: FUNCTIONAL

## 2022-04-28 DEVICE — BASEPLATE TIB UNIV TRIATHLON SZ 2: Type: IMPLANTABLE DEVICE | Status: FUNCTIONAL

## 2022-04-28 DEVICE — IMPLANTABLE DEVICE: Type: IMPLANTABLE DEVICE | Status: FUNCTIONAL

## 2022-04-28 RX ORDER — POLYETHYLENE GLYCOL 3350 17 G/17G
17 POWDER, FOR SOLUTION ORAL AT BEDTIME
Refills: 0 | Status: DISCONTINUED | OUTPATIENT
Start: 2022-04-28 | End: 2022-05-01

## 2022-04-28 RX ORDER — TRAZODONE HCL 50 MG
1 TABLET ORAL
Qty: 0 | Refills: 0 | DISCHARGE

## 2022-04-28 RX ORDER — FENTANYL CITRATE 50 UG/ML
25 INJECTION INTRAVENOUS
Refills: 0 | Status: DISCONTINUED | OUTPATIENT
Start: 2022-04-28 | End: 2022-04-28

## 2022-04-28 RX ORDER — ONDANSETRON 8 MG/1
4 TABLET, FILM COATED ORAL EVERY 6 HOURS
Refills: 0 | Status: DISCONTINUED | OUTPATIENT
Start: 2022-04-28 | End: 2022-05-01

## 2022-04-28 RX ORDER — OXYCODONE HYDROCHLORIDE 5 MG/1
5 TABLET ORAL EVERY 4 HOURS
Refills: 0 | Status: DISCONTINUED | OUTPATIENT
Start: 2022-04-28 | End: 2022-05-01

## 2022-04-28 RX ORDER — ACETAMINOPHEN 500 MG
1000 TABLET ORAL ONCE
Refills: 0 | Status: COMPLETED | OUTPATIENT
Start: 2022-04-28 | End: 2022-04-29

## 2022-04-28 RX ORDER — FENTANYL CITRATE 50 UG/ML
50 INJECTION INTRAVENOUS
Refills: 0 | Status: DISCONTINUED | OUTPATIENT
Start: 2022-04-28 | End: 2022-04-28

## 2022-04-28 RX ORDER — SODIUM CHLORIDE 9 MG/ML
1000 INJECTION INTRAMUSCULAR; INTRAVENOUS; SUBCUTANEOUS
Refills: 0 | Status: DISCONTINUED | OUTPATIENT
Start: 2022-04-29 | End: 2022-05-01

## 2022-04-28 RX ORDER — TRAMADOL HYDROCHLORIDE 50 MG/1
50 TABLET ORAL EVERY 8 HOURS
Refills: 0 | Status: DISCONTINUED | OUTPATIENT
Start: 2022-04-28 | End: 2022-05-01

## 2022-04-28 RX ORDER — CALCIUM ACETATE 667 MG
1 TABLET ORAL
Qty: 0 | Refills: 0 | DISCHARGE

## 2022-04-28 RX ORDER — CALCIUM ACETATE 667 MG
667 TABLET ORAL DAILY
Refills: 0 | Status: DISCONTINUED | OUTPATIENT
Start: 2022-04-28 | End: 2022-05-01

## 2022-04-28 RX ORDER — OXYCODONE HYDROCHLORIDE 5 MG/1
10 TABLET ORAL EVERY 4 HOURS
Refills: 0 | Status: DISCONTINUED | OUTPATIENT
Start: 2022-04-28 | End: 2022-05-01

## 2022-04-28 RX ORDER — SENNA PLUS 8.6 MG/1
2 TABLET ORAL AT BEDTIME
Refills: 0 | Status: DISCONTINUED | OUTPATIENT
Start: 2022-04-28 | End: 2022-05-01

## 2022-04-28 RX ORDER — CEFAZOLIN SODIUM 1 G
2000 VIAL (EA) INJECTION EVERY 8 HOURS
Refills: 0 | Status: COMPLETED | OUTPATIENT
Start: 2022-04-28 | End: 2022-04-29

## 2022-04-28 RX ORDER — BUDESONIDE AND FORMOTEROL FUMARATE DIHYDRATE 160; 4.5 UG/1; UG/1
2 AEROSOL RESPIRATORY (INHALATION)
Refills: 0 | Status: DISCONTINUED | OUTPATIENT
Start: 2022-04-28 | End: 2022-05-01

## 2022-04-28 RX ORDER — ACETAMINOPHEN 500 MG
1000 TABLET ORAL EVERY 6 HOURS
Refills: 0 | Status: COMPLETED | OUTPATIENT
Start: 2022-04-28 | End: 2022-04-29

## 2022-04-28 RX ORDER — GABAPENTIN 400 MG/1
100 CAPSULE ORAL
Refills: 0 | Status: DISCONTINUED | OUTPATIENT
Start: 2022-04-28 | End: 2022-05-01

## 2022-04-28 RX ORDER — TRAMADOL HYDROCHLORIDE 50 MG/1
50 TABLET ORAL ONCE
Refills: 0 | Status: DISCONTINUED | OUTPATIENT
Start: 2022-04-28 | End: 2022-04-28

## 2022-04-28 RX ORDER — ENOXAPARIN SODIUM 100 MG/ML
30 INJECTION SUBCUTANEOUS EVERY 12 HOURS
Refills: 0 | Status: DISCONTINUED | OUTPATIENT
Start: 2022-04-28 | End: 2022-05-01

## 2022-04-28 RX ORDER — CHOLECALCIFEROL (VITAMIN D3) 125 MCG
1000 CAPSULE ORAL DAILY
Refills: 0 | Status: DISCONTINUED | OUTPATIENT
Start: 2022-04-28 | End: 2022-05-01

## 2022-04-28 RX ORDER — SODIUM CHLORIDE 9 MG/ML
3 INJECTION INTRAMUSCULAR; INTRAVENOUS; SUBCUTANEOUS EVERY 8 HOURS
Refills: 0 | Status: DISCONTINUED | OUTPATIENT
Start: 2022-04-28 | End: 2022-04-28

## 2022-04-28 RX ORDER — CHLORHEXIDINE GLUCONATE 213 G/1000ML
1 SOLUTION TOPICAL ONCE
Refills: 0 | Status: COMPLETED | OUTPATIENT
Start: 2022-04-28 | End: 2022-04-28

## 2022-04-28 RX ORDER — SODIUM CHLORIDE 9 MG/ML
1000 INJECTION, SOLUTION INTRAVENOUS
Refills: 0 | Status: DISCONTINUED | OUTPATIENT
Start: 2022-04-28 | End: 2022-04-28

## 2022-04-28 RX ORDER — GABAPENTIN 400 MG/1
100 CAPSULE ORAL ONCE
Refills: 0 | Status: COMPLETED | OUTPATIENT
Start: 2022-04-28 | End: 2022-04-28

## 2022-04-28 RX ADMIN — TRAMADOL HYDROCHLORIDE 50 MILLIGRAM(S): 50 TABLET ORAL at 21:35

## 2022-04-28 RX ADMIN — Medication 1 APPLICATION(S): at 07:01

## 2022-04-28 RX ADMIN — GABAPENTIN 100 MILLIGRAM(S): 400 CAPSULE ORAL at 17:21

## 2022-04-28 RX ADMIN — CHLORHEXIDINE GLUCONATE 1 APPLICATION(S): 213 SOLUTION TOPICAL at 07:00

## 2022-04-28 RX ADMIN — OXYCODONE HYDROCHLORIDE 10 MILLIGRAM(S): 5 TABLET ORAL at 13:48

## 2022-04-28 RX ADMIN — BUDESONIDE AND FORMOTEROL FUMARATE DIHYDRATE 2 PUFF(S): 160; 4.5 AEROSOL RESPIRATORY (INHALATION) at 22:29

## 2022-04-28 RX ADMIN — SODIUM CHLORIDE 3 MILLILITER(S): 9 INJECTION INTRAMUSCULAR; INTRAVENOUS; SUBCUTANEOUS at 06:59

## 2022-04-28 RX ADMIN — TRAMADOL HYDROCHLORIDE 50 MILLIGRAM(S): 50 TABLET ORAL at 07:00

## 2022-04-28 RX ADMIN — OXYCODONE HYDROCHLORIDE 10 MILLIGRAM(S): 5 TABLET ORAL at 14:12

## 2022-04-28 RX ADMIN — TRAMADOL HYDROCHLORIDE 50 MILLIGRAM(S): 50 TABLET ORAL at 14:12

## 2022-04-28 RX ADMIN — Medication 1000 MILLIGRAM(S): at 17:34

## 2022-04-28 RX ADMIN — ENOXAPARIN SODIUM 30 MILLIGRAM(S): 100 INJECTION SUBCUTANEOUS at 21:35

## 2022-04-28 RX ADMIN — SENNA PLUS 2 TABLET(S): 8.6 TABLET ORAL at 21:35

## 2022-04-28 RX ADMIN — Medication 1000 MILLIGRAM(S): at 17:11

## 2022-04-28 RX ADMIN — Medication 667 MILLIGRAM(S): at 17:11

## 2022-04-28 RX ADMIN — Medication 1000 UNIT(S): at 17:10

## 2022-04-28 RX ADMIN — TRAMADOL HYDROCHLORIDE 50 MILLIGRAM(S): 50 TABLET ORAL at 22:20

## 2022-04-28 RX ADMIN — GABAPENTIN 100 MILLIGRAM(S): 400 CAPSULE ORAL at 07:00

## 2022-04-28 RX ADMIN — ONDANSETRON 4 MILLIGRAM(S): 8 TABLET, FILM COATED ORAL at 17:21

## 2022-04-28 RX ADMIN — Medication 100 MILLIGRAM(S): at 17:11

## 2022-04-28 RX ADMIN — TRAMADOL HYDROCHLORIDE 50 MILLIGRAM(S): 50 TABLET ORAL at 13:15

## 2022-04-28 RX ADMIN — POLYETHYLENE GLYCOL 3350 17 GRAM(S): 17 POWDER, FOR SOLUTION ORAL at 21:35

## 2022-04-28 NOTE — CONSULT NOTE ADULT - ASSESSMENT
Home    My Content    Search   Help   Log out  Prescription Monitoring Program Registry  Welcome [place] Calle  ×  Update Personal Info  FAQ  Search Results Help  Help  ×Due to scheduled maintenance work, the  Registry will be unavailable between 8:00 pm and 10:00 pm on Wednesday, April 27, 2022.   Patient Search   Multi-Patient Search   MME Calculator   Reports   Drug List   Designation   My SANIYA #  Data Detail Level: Printer-Friendly View Extended View  Confidential Drug Utilization Report  Search Terms: jesika katz, 1950Search Date: 04/28/2022 23:22:31 PM  The Drug Utilization Report below displays all of the controlled substance prescriptions, if any, that your patient has filled in the last twelve months. The information displayed on this report is compiled from pharmacy submissions to the Department, and accurately reflects the information as submitted by the pharmacies.    This report was requested by: Yani Calle | Reference #: [id number]    Others' Prescriptions  Patient Name: Jesika Funez Date: 1950  Address: [address] [place] [id number]: Female  Rx Written	Rx Dispensed	Drug	Quantity	Days Supply	Prescriber Name	Prescriber Saniya #	Payment Method	Dispenser  04/19/2022	04/27/2022	oxycodone-acetaminophen  mg tab	28	7	Rk Moran MD	[id number]	[facility]  * - Drugs marked with an asterisk are compound drugs. If the compound drug is made up of more than one controlled substance, then each controlled substance will be a separate row in the table.

## 2022-04-28 NOTE — PHYSICAL THERAPY INITIAL EVALUATION ADULT - PERTINENT HX OF CURRENT PROBLEM, REHAB EVAL
Patient admitted for TKR (R) due to worsening pain Patient admitted for TKR (R) due to worsening pain on Right knee

## 2022-04-28 NOTE — PROGRESS NOTE ADULT - SUBJECTIVE AND OBJECTIVE BOX
71yFemale    Diagnosis:  S/p LEFT Total Knee Replacement POD#0    Patient was seen and evaluated at bedside. Patient with no acute complaints.   Pain is  well controlled.      Denies CP/SOB, dyspnea, paresthesias, N/V/D, palpitations.     Vital Signs Last 24 Hrs  T(C): 36.5 (28 Apr 2022 12:33), Max: 37.2 (28 Apr 2022 07:01)  T(F): 97.7 (28 Apr 2022 12:33), Max: 99 (28 Apr 2022 07:01)  HR: 76 (28 Apr 2022 16:35) (60 - 76)  BP: 154/79 (28 Apr 2022 16:35) (131/60 - 154/79)  BP(mean): 103 (28 Apr 2022 11:23) (87 - 103)  RR: 16 (28 Apr 2022 12:33) (12 - 16)  SpO2: 97% (28 Apr 2022 16:35) (93% - 100%)  I&O's Detail    28 Apr 2022 07:01  -  28 Apr 2022 19:15  --------------------------------------------------------  IN:    Lactated Ringers Bolus: 2000 mL  Total IN: 2000 mL    OUT:  Total OUT: 0 mL    Total NET: 2000 mL          Physical Exam:    General: AAOx3, NAD, resting comfortably in bed.    Left knee:  Dressing is C/D/I. Skin is pink and warm. SILT.  No drainage.   Lower extremities:  No calf tenderness, calves are soft. 2+pulses. NVI. 5/5 Strength of EHL/TA/gastrocnemius B/L.  Good capillary refill. SILT.                          7.6    7.43  )-----------( 385      ( 28 Apr 2022 11:21 )             27.3     04-28    144  |  112<H>  |  13  ----------------------------<  76  3.7   |  26  |  0.49<L>    Ca    8.3<L>      28 Apr 2022 11:21        Impression:  71yFemale S/p Left Total Knee Replacement POD#0  Plan:  -  Pain management    - f/u AM CBC, not currently dizzy or chest pain or palpitation. will consider transfusion after checking tmrw am labs.   -  Dvt prophylaxis with Lovenox  -  Daily Physical Therapy:  WBAT of the left lower extremity with walker  -  Discharge planning: Home Vs. Rehab pending Physical therapy eval.  -  Continue with Post-op Antibiotics x 24hrs  -  Case d/w Dr. Moran  -  Dressing change tomorrow    71yFemale    Diagnosis:  S/p LEFT Total Knee Replacement POD#0    Patient was seen and evaluated at bedside. Patient with no acute complaints.   Pain is  well controlled.      Denies CP/SOB, dyspnea, paresthesias, N/V/D, palpitations.     Vital Signs Last 24 Hrs  T(C): 36.5 (28 Apr 2022 12:33), Max: 37.2 (28 Apr 2022 07:01)  T(F): 97.7 (28 Apr 2022 12:33), Max: 99 (28 Apr 2022 07:01)  HR: 76 (28 Apr 2022 16:35) (60 - 76)  BP: 154/79 (28 Apr 2022 16:35) (131/60 - 154/79)  BP(mean): 103 (28 Apr 2022 11:23) (87 - 103)  RR: 16 (28 Apr 2022 12:33) (12 - 16)  SpO2: 97% (28 Apr 2022 16:35) (93% - 100%)  I&O's Detail    28 Apr 2022 07:01  -  28 Apr 2022 19:15  --------------------------------------------------------  IN:    Lactated Ringers Bolus: 2000 mL  Total IN: 2000 mL    OUT:  Total OUT: 0 mL    Total NET: 2000 mL          Physical Exam:    General: AAOx3, NAD, resting comfortably in bed.    Left knee:  Dressing is C/D/I. Skin is pink and warm. SILT.  No drainage.   Lower extremities:  No calf tenderness, calves are soft. 2+pulses. NVI. 5/5 Strength of EHL/TA/gastrocnemius B/L.  Good capillary refill. SILT.                          7.6    7.43  )-----------( 385      ( 28 Apr 2022 11:21 )             27.3     04-28    144  |  112<H>  |  13  ----------------------------<  76  3.7   |  26  |  0.49<L>    Ca    8.3<L>      28 Apr 2022 11:21        Impression:  71yFemale S/p Left Total Knee Replacement POD#0  Plan:  -  Pain management    - transfuse 2 units prbc for acute blood loss anemia pending consent.   -  Dvt prophylaxis with Lovenox  -  Daily Physical Therapy:  WBAT of the left lower extremity with walker  -  Discharge planning: Home Vs. Rehab pending Physical therapy eval.  -  Continue with Post-op Antibiotics x 24hrs  -  Case d/w Dr. Moran  -  Dressing change tomorrow    71yFemale    Diagnosis:  S/p LEFT Total Knee Replacement POD#0    Patient was seen and evaluated at bedside. Patient with no acute complaints.   Pain is  well controlled.      Denies CP/SOB, dyspnea, paresthesias, N/V/D, palpitations.     Vital Signs Last 24 Hrs  T(C): 36.5 (28 Apr 2022 12:33), Max: 37.2 (28 Apr 2022 07:01)  T(F): 97.7 (28 Apr 2022 12:33), Max: 99 (28 Apr 2022 07:01)  HR: 76 (28 Apr 2022 16:35) (60 - 76)  BP: 154/79 (28 Apr 2022 16:35) (131/60 - 154/79)  BP(mean): 103 (28 Apr 2022 11:23) (87 - 103)  RR: 16 (28 Apr 2022 12:33) (12 - 16)  SpO2: 97% (28 Apr 2022 16:35) (93% - 100%)  I&O's Detail    28 Apr 2022 07:01  -  28 Apr 2022 19:15  --------------------------------------------------------  IN:    Lactated Ringers Bolus: 2000 mL  Total IN: 2000 mL    OUT:  Total OUT: 0 mL    Total NET: 2000 mL          Physical Exam:    General: AAOx3, NAD, resting comfortably in bed.    Left knee:  Dressing is C/D/I. Skin is pink and warm. SILT.  No drainage.   Lower extremities:  No calf tenderness, calves are soft. 2+pulses. NVI. 5/5 Strength of EHL/TA/gastrocnemius B/L.  Good capillary refill. SILT.                          7.6    7.43  )-----------( 385      ( 28 Apr 2022 11:21 )             27.3     04-28    144  |  112<H>  |  13  ----------------------------<  76  3.7   |  26  |  0.49<L>    Ca    8.3<L>      28 Apr 2022 11:21        Impression:  71yFemale S/p Left Total Knee Replacement POD#0  Plan:  -  Pain management    history of anemia, hgb was 8.0 at clearance,but due to the risk of further blood loss, will transfuse    - transfuse 1 unit prbc for acute blood loss anemia   -  Dvt prophylaxis with Lovenox  -  Daily Physical Therapy:  WBAT of the left lower extremity with walker  -  Discharge planning: Home Vs. Rehab pending Physical therapy eval.  -  Continue with Post-op Antibiotics x 24hrs  -  Case d/w Dr. Moran  -  Dressing change tomorrow

## 2022-04-28 NOTE — CONSULT NOTE ADULT - SUBJECTIVE AND OBJECTIVE BOX
Source of information: , Chart review, patient  Patient language: English  : n/a    CC: Patient is a 71y old  Female who presents with a chief complaint of left total knee replacement  Patient's goal for surgery is "to walk without pain and enjoy my life." (20 Apr 2022 15:00)      HPI:  72 y/o female with h/o moderate seasonal asthma (controlled with Advair prn), GERD (diet controlled),  DVT RLE (2017 and 2019), complains of severe, sharp, left knee pain, 8/10 aggravated by walking. States the knee "cracks" Patient is diagnosed with unilateral primary osteoarthritis, left knee and is scheduled for left total knee replacement 4/28/22.     (20 Apr 2022 15:00)    Pt s/p left knee replacement pod#0.  + left knee pain. Pain on exertion.  No nausea or vomiting.  No chest pain or sob.  + history of anemia.  Would avoid all nsaids.      PAIN SCORE:  5/10     SCALE USED: (1-10 VNRS)    PAST MEDICAL & SURGICAL HISTORY:  Peptic ulcer disease    Anemia    Asthma    GERD (gastroesophageal reflux disease)    Vitamin D deficiency    DVT, lower extremity  RLE x 2 (2017 and 2019)    History of fracture of leg  RLE (2021) with hardware implanted        FAMILY HISTORY:  No pertinent family history in first degree relatives          SOCIAL HISTORY:  [x ] Denies Smoking, Alcohol, or Drug Use    Allergies    dust (Unknown)  ibuprofen (Rash)  latex (Rash)  shellfish (Unknown)    Intolerances        MEDICATIONS:    MEDICATIONS  (STANDING):  acetaminophen     Tablet .. 1000 milliGRAM(s) Oral every 6 hours  acetaminophen   IVPB .. 1000 milliGRAM(s) IV Intermittent once  budesonide 160 MICROgram(s)/formoterol 4.5 MICROgram(s) Inhaler 2 Puff(s) Inhalation two times a day  calcium acetate 667 milliGRAM(s) Oral daily  ceFAZolin   IVPB 2000 milliGRAM(s) IV Intermittent every 8 hours  cholecalciferol 1000 Unit(s) Oral daily  enoxaparin Injectable 30 milliGRAM(s) SubCutaneous every 12 hours  gabapentin 100 milliGRAM(s) Oral two times a day  polyethylene glycol 3350 17 Gram(s) Oral at bedtime  senna 2 Tablet(s) Oral at bedtime  traMADol 50 milliGRAM(s) Oral every 8 hours    MEDICATIONS  (PRN):  ondansetron Injectable 4 milliGRAM(s) IV Push every 6 hours PRN Nausea and/or Vomiting  oxyCODONE    IR 5 milliGRAM(s) Oral every 4 hours PRN Moderate Pain (4 - 6)  oxyCODONE    IR 10 milliGRAM(s) Oral every 4 hours PRN Severe Pain (7 - 10)      Vital Signs Last 24 Hrs  T(C): 36.7 (28 Apr 2022 20:45), Max: 37.2 (28 Apr 2022 07:01)  T(F): 98.1 (28 Apr 2022 20:45), Max: 99 (28 Apr 2022 07:01)  HR: 71 (28 Apr 2022 20:45) (60 - 79)  BP: 117/70 (28 Apr 2022 20:45) (117/70 - 154/79)  BP(mean): 103 (28 Apr 2022 11:23) (87 - 103)  RR: 16 (28 Apr 2022 20:45) (12 - 16)  SpO2: 96% (28 Apr 2022 20:45) (93% - 100%)    LABS:                          7.6    7.43  )-----------( 385      ( 28 Apr 2022 11:21 )             27.3     04-28    144  |  112<H>  |  13  ----------------------------<  76  3.7   |  26  |  0.49<L>    Ca    8.3<L>      28 Apr 2022 11:21            CAPILLARY BLOOD GLUCOSE      POCT Blood Glucose.: 88 mg/dL (28 Apr 2022 10:59)  POCT Blood Glucose.: 111 mg/dL (28 Apr 2022 06:50)      REVIEW OF SYSTEMS:  CONSTITUTIONAL: No fever or fatigue  RESPIRATORY: No cough, wheezing, chills or hemoptysis; No shortness of breath  CARDIOVASCULAR: No chest pain, palpitations, dizziness, or leg swelling  GASTROINTESTINAL: No abdominal or epigastric pain. No nausea, vomiting; No diarrhea or constipation.   GENITOURINARY: No dysuria, frequency, hematuria, retention or incontinence  MUSCULOSKELETAL: + left knee pain    NEURO: No weakness, no numbness   PSYCHIATRIC: No depression, anxiety, mood swings, or difficulty sleeping    PHYSICAL EXAM:  GENERAL:  Alert & Oriented X3, NAD, Good concentration  CHEST/LUNG: Clear to auscultation bilaterally; No rales, rhonchi, wheezing, or rubs  HEART: Regular rate and rhythm; No murmurs, rubs, or gallops  ABDOMEN: Soft, Nontender, Nondistended; Bowel sounds present  : no incontinence, no flank pain  EXTREMITIES:  2+ Peripheral Pulses, No cyanosis, or edema  MUSCULOSKELETAL: + left knee tenderness + edema    NEUROLOGICAL: awake and alert and oriented   SKIN: No rashes or lesions    Radiology:    Drug Screen:  enoxaparin Injectable 30 milliGRAM(s) SubCutaneous every 12 hours    ceFAZolin   IVPB 2000 milliGRAM(s) IV Intermittent every 8 hours        ORT Score   Family Hx of substance abuse	Female	Male  Alcohol 	                                              1              3  Illegal drugs	                                      2              3  Rx drugs                                               4	      4    Personal Hx of substance abuse		  Alcohol 	                                               3	      3  Illegal drugs                                  	       4	      4  Rx drugs                                                5	      5  Age between 16- 45 years	               1             1  hx preadolescent sexual abuse	       3	      0  Psychological disease		  ADD, OCD, bipolar, schizophrenia	2	      2  Depression                                    	1	      1  Score totals 		0   		  a score of 3 or lower indicates low risk for opioid abuse		  a score of 4-7 indicates moderate risk for opioid abuse		  a score of 8 or higher indicates high risk for opioid abuse	    Risk factors associated with adverse outcomes related to opioid treatment  [ ]  Concurrent benzodiazepine use  [ ]  History/ Active substance use or alcohol use disorder  [ ] Psychiatric co-morbidity  [ ] Sleep apnea  [ ] COPD  [ ] BMI> 35  [ ] Liver dysfunction  [ ] Renal dysfunction  [ ] CHF  [ ] Smoker  [x ]  Age > 60 years      [ x]  [place]  Reviewed and Copied to Chart. See below.

## 2022-04-28 NOTE — CONSULT NOTE ADULT - PROBLEM SELECTOR RECOMMENDATION 9
s/p  left TKR POD#0  High risk medications reviewed. Avoid polypharmacy. Avoid IV opioids. Avoid benzodiazepines. Non-pharmacological sleep aides initiated. Non-opioid medications and non-pharmacological pain management measures initiated.  Routine Meds  - Acetaminophen 1 gram po q 6 hours atc for 24 hours  - Gabapentin 100mg po q 12 hours  - Tramadol 50mg po q 8 hours   Mild Pain (Pain Level 1-3)  - Non - pharmacological measures  Moderate Pain (Pain Level 4-6)  - Oxycodone 5mg po q 4 hours prn   Severe Pain ( Pain Level - 7-10)  - Oxyodone 10mg po q 4 hours prn   Bowel Regimen   - Senna and Miralax daily  OOB/PT  Plan discussed with patient and staff  + anemia

## 2022-04-28 NOTE — PHYSICAL THERAPY INITIAL EVALUATION ADULT - GENERAL OBSERVATIONS, REHAB EVAL
Pt. received in supine; AxOX3, Pt. received in supine; AxOX3, IV on (R) hand in place; ace bandage on (L) knee intact. Pt. reports pain (8/10) on left knee.

## 2022-04-28 NOTE — PHYSICAL THERAPY INITIAL EVALUATION ADULT - CRITERIA FOR SKILLED THERAPEUTIC INTERVENTIONS
impairments found/functional limitations in following categories/risk reduction/prevention/rehab potential/anticipated equipment needs at discharge

## 2022-04-29 LAB
ANION GAP SERPL CALC-SCNC: 8 MMOL/L — SIGNIFICANT CHANGE UP (ref 5–17)
BUN SERPL-MCNC: 8 MG/DL — SIGNIFICANT CHANGE UP (ref 7–18)
CALCIUM SERPL-MCNC: 8.4 MG/DL — SIGNIFICANT CHANGE UP (ref 8.4–10.5)
CHLORIDE SERPL-SCNC: 104 MMOL/L — SIGNIFICANT CHANGE UP (ref 96–108)
CO2 SERPL-SCNC: 26 MMOL/L — SIGNIFICANT CHANGE UP (ref 22–31)
CREAT SERPL-MCNC: 0.41 MG/DL — LOW (ref 0.5–1.3)
EGFR: 106 ML/MIN/1.73M2 — SIGNIFICANT CHANGE UP
GLUCOSE BLDC GLUCOMTR-MCNC: 150 MG/DL — HIGH (ref 70–99)
GLUCOSE SERPL-MCNC: 127 MG/DL — HIGH (ref 70–99)
HCT VFR BLD CALC: 28.1 % — LOW (ref 34.5–45)
HGB BLD-MCNC: 8.3 G/DL — LOW (ref 11.5–15.5)
MCHC RBC-ENTMCNC: 20.2 PG — LOW (ref 27–34)
MCHC RBC-ENTMCNC: 29.5 GM/DL — LOW (ref 32–36)
MCV RBC AUTO: 68.5 FL — LOW (ref 80–100)
NRBC # BLD: 0 /100 WBCS — SIGNIFICANT CHANGE UP (ref 0–0)
PLATELET # BLD AUTO: 341 K/UL — SIGNIFICANT CHANGE UP (ref 150–400)
POTASSIUM SERPL-MCNC: 3.4 MMOL/L — LOW (ref 3.5–5.3)
POTASSIUM SERPL-SCNC: 3.4 MMOL/L — LOW (ref 3.5–5.3)
RBC # BLD: 4.1 M/UL — SIGNIFICANT CHANGE UP (ref 3.8–5.2)
RBC # FLD: 25.4 % — HIGH (ref 10.3–14.5)
SODIUM SERPL-SCNC: 138 MMOL/L — SIGNIFICANT CHANGE UP (ref 135–145)
WBC # BLD: 8.4 K/UL — SIGNIFICANT CHANGE UP (ref 3.8–10.5)
WBC # FLD AUTO: 8.4 K/UL — SIGNIFICANT CHANGE UP (ref 3.8–10.5)

## 2022-04-29 PROCEDURE — 99232 SBSQ HOSP IP/OBS MODERATE 35: CPT

## 2022-04-29 RX ADMIN — ENOXAPARIN SODIUM 30 MILLIGRAM(S): 100 INJECTION SUBCUTANEOUS at 11:52

## 2022-04-29 RX ADMIN — TRAMADOL HYDROCHLORIDE 50 MILLIGRAM(S): 50 TABLET ORAL at 05:57

## 2022-04-29 RX ADMIN — BUDESONIDE AND FORMOTEROL FUMARATE DIHYDRATE 2 PUFF(S): 160; 4.5 AEROSOL RESPIRATORY (INHALATION) at 11:53

## 2022-04-29 RX ADMIN — Medication 1000 MILLIGRAM(S): at 01:41

## 2022-04-29 RX ADMIN — ONDANSETRON 4 MILLIGRAM(S): 8 TABLET, FILM COATED ORAL at 19:24

## 2022-04-29 RX ADMIN — TRAMADOL HYDROCHLORIDE 50 MILLIGRAM(S): 50 TABLET ORAL at 15:20

## 2022-04-29 RX ADMIN — OXYCODONE HYDROCHLORIDE 10 MILLIGRAM(S): 5 TABLET ORAL at 10:20

## 2022-04-29 RX ADMIN — TRAMADOL HYDROCHLORIDE 50 MILLIGRAM(S): 50 TABLET ORAL at 06:38

## 2022-04-29 RX ADMIN — Medication 1000 MILLIGRAM(S): at 11:52

## 2022-04-29 RX ADMIN — ONDANSETRON 4 MILLIGRAM(S): 8 TABLET, FILM COATED ORAL at 11:47

## 2022-04-29 RX ADMIN — SODIUM CHLORIDE 105 MILLILITER(S): 9 INJECTION INTRAMUSCULAR; INTRAVENOUS; SUBCUTANEOUS at 11:47

## 2022-04-29 RX ADMIN — GABAPENTIN 100 MILLIGRAM(S): 400 CAPSULE ORAL at 18:53

## 2022-04-29 RX ADMIN — TRAMADOL HYDROCHLORIDE 50 MILLIGRAM(S): 50 TABLET ORAL at 15:00

## 2022-04-29 RX ADMIN — Medication 1000 MILLIGRAM(S): at 19:30

## 2022-04-29 RX ADMIN — Medication 1000 MILLIGRAM(S): at 05:57

## 2022-04-29 RX ADMIN — Medication 667 MILLIGRAM(S): at 11:52

## 2022-04-29 RX ADMIN — GABAPENTIN 100 MILLIGRAM(S): 400 CAPSULE ORAL at 05:57

## 2022-04-29 RX ADMIN — Medication 400 MILLIGRAM(S): at 18:53

## 2022-04-29 RX ADMIN — Medication 1000 UNIT(S): at 18:46

## 2022-04-29 RX ADMIN — OXYCODONE HYDROCHLORIDE 10 MILLIGRAM(S): 5 TABLET ORAL at 09:20

## 2022-04-29 RX ADMIN — Medication 1000 MILLIGRAM(S): at 06:38

## 2022-04-29 RX ADMIN — ENOXAPARIN SODIUM 30 MILLIGRAM(S): 100 INJECTION SUBCUTANEOUS at 21:18

## 2022-04-29 RX ADMIN — Medication 1000 MILLIGRAM(S): at 00:10

## 2022-04-29 RX ADMIN — Medication 1000 MILLIGRAM(S): at 12:30

## 2022-04-29 RX ADMIN — Medication 100 MILLIGRAM(S): at 00:10

## 2022-04-29 RX ADMIN — BUDESONIDE AND FORMOTEROL FUMARATE DIHYDRATE 2 PUFF(S): 160; 4.5 AEROSOL RESPIRATORY (INHALATION) at 21:21

## 2022-04-29 NOTE — DISCHARGE NOTE PROVIDER - NSDCMRMEDTOKEN_GEN_ALL_CORE_FT
Advair Diskus 500 mcg-50 mcg inhalation powder: 1 puff(s) inhaled 2 times a day  calcium acetate 667 mg oral tablet: 1 tab(s) orally once a day   Lovenox 30 mg/0.3 mL injectable solution: 30 milligram(s) injectable every 12 hours  Movantik 12.5 mg oral tablet: 1 tab(s) orally once a day (in the morning)  ondansetron 8 mg oral tablet: 1 tab(s) orally 3 times a day  Percocet 10/325 oral tablet: 1 tab(s) orally every 6 hours  Vitamin D3 25 mcg (1000 intl units) oral capsule: 1 cap(s) orally once a week   Advair Diskus 500 mcg-50 mcg inhalation powder: 1 puff(s) inhaled 2 times a day  calcium acetate 667 mg oral tablet: 1 tab(s) orally once a day   ferrous sulfate 325 mg (65 mg elemental iron) oral tablet: 1 tab(s) orally 2 times a day MDD:2  Lovenox 30 mg/0.3 mL injectable solution: 30 milligram(s) injectable every 12 hours  MiraLax oral powder for reconstitution: 17 gram(s) orally once a day, As Needed -for constipation MDD:1  Movantik 12.5 mg oral tablet: 1 tab(s) orally once a day (in the morning)  ondansetron 8 mg oral tablet: 1 tab(s) orally 3 times a day  Percocet 10/325 oral tablet: 1 tab(s) orally every 6 hours  Protonix 40 mg oral delayed release tablet: 1 tab(s) orally once a day MDD:1  senna 8.6 mg oral tablet: 2 tab(s) orally once a day (at bedtime) MDD:2  Vitamin D3 25 mcg (1000 intl units) oral capsule: 1 cap(s) orally once a week

## 2022-04-29 NOTE — PROGRESS NOTE ADULT - ASSESSMENT
Home    My Content    Search   Help   Log out  Prescription Monitoring Program Registry  Welcome Alirezadakota Calle  ×  Update Personal Info  FAQ  Search Results Help  Help  ×Due to scheduled maintenance work, the  Registry will be unavailable between 8:00 pm and 10:00 pm on Wednesday, April 27, 2022.   Patient Search   Multi-Patient Search   MME Calculator   Reports   Drug List   Designation   My SANIYA #  Data Detail Level: Printer-Friendly View Extended View  Confidential Drug Utilization Report  Search Terms: jesika katz, 1950Search Date: 04/28/2022 23:22:31 PM  The Drug Utilization Report below displays all of the controlled substance prescriptions, if any, that your patient has filled in the last twelve months. The information displayed on this report is compiled from pharmacy submissions to the Department, and accurately reflects the information as submitted by the pharmacies.    This report was requested by: Yani Calle | Reference #: [id number]    Others' Prescriptions  Patient Name: Jesika Funez Date: 1950  Address: [address] [place] [id number]: Female  Rx Written	Rx Dispensed	Drug	Quantity	Days Supply	Prescriber Name	Prescriber Saniya #	Payment Method	Dispenser  04/19/2022	04/27/2022	oxycodone-acetaminophen  mg tab	28	7	Rk Moran MD	[id number]	[facility]  * - Drugs marked with an asterisk are compound drugs. If the compound drug is made up of more than one controlled substance, then each controlled substance will be a separate row in the table.

## 2022-04-29 NOTE — DISCHARGE NOTE PROVIDER - NSDCCPCAREPLAN_GEN_ALL_CORE_FT
PRINCIPAL DISCHARGE DIAGNOSIS  Diagnosis: Unilateral primary osteoarthritis, left knee  Assessment and Plan of Treatment: Pain Management- See Attached Medication Reconciliation  **StretchStrap Stretching exercises for Total knee replacement***  Weight Bearing Status: WBAT of the Left knee  Equipment needs: Commode, Walker  Dressing: Please keep bandage/dressing Clean, Dry, and Intact.  May change dressing of right knee q3-4 days with 4x4 gauze, one abdominal pad and a 6inch ACE bandage.  Incision Site: REMOVE STAPLES on 5/13/2022  STAPLES AND DRESSING TO BE REMOVED BY NURSE  NURSE TO APPLY STERI-STRIPS TO THE WOUND AFTER STAPLE REMOVAL   DVT prophylaxis: D/C Lovenox on 5/13/2022  PT/Occupational Therapy are Activities of Daily Living as appropriate  Follow-Up with Dr. Moran in TWO WEEKS at [phone]   Showering allowed once staples are removed in 14 days         SECONDARY DISCHARGE DIAGNOSES  Diagnosis: Seasonal asthma  Assessment and Plan of Treatment: - Continue with home medications

## 2022-04-29 NOTE — PROGRESS NOTE ADULT - SUBJECTIVE AND OBJECTIVE BOX
71yFemale    Diagnosis:  S/p LEFT Total Knee Replacement POD#1    Patient was seen and evaluated at bedside. Patient with no acute complaints.   Patient received 1 unit pRBC yesterday with no complications, tolerated well  Pain is  well controlled.  Patient has been OOB to chair with PT  Denies CP/SOB, dyspnea, paresthesias, N/V/D, palpitations.     Vital Signs Last 24 Hrs  T(C): 36.9 (29 Apr 2022 05:17), Max: 37.1 (28 Apr 2022 23:16)  T(F): 98.5 (29 Apr 2022 05:17), Max: 98.8 (28 Apr 2022 23:16)  HR: 90 (29 Apr 2022 05:17) (60 - 90)  BP: 127/56 (29 Apr 2022 05:17) (117/70 - 154/79)  BP(mean): 103 (28 Apr 2022 11:23) (87 - 103)  RR: 16 (29 Apr 2022 05:17) (12 - 16)  SpO2: 95% (29 Apr 2022 05:17) (93% - 100%)  I&O's Detail    28 Apr 2022 07:01  -  29 Apr 2022 07:00  --------------------------------------------------------  IN:    Lactated Ringers Bolus: 2000 mL    PRBCs (Packed Red Blood Cells): 450 mL    sodium chloride 0.9%: 1050 mL  Total IN: 3500 mL    OUT:  Total OUT: 0 mL    Total NET: 3500 mL          Physical Exam:    General: AAOx3, NAD, resting comfortably in bed.  Left knee:  Dressing is C/D/I. Skin is pink and warm. SILT.  No drainage.   Lower extremities:  No calf tenderness, calves are soft. 2+pulses. NVI. 5/5 Strength of EHL/TA/gastrocnemius B/L.  Good capillary refill. SILT.                          8.3    8.40  )-----------( 341      ( 29 Apr 2022 06:15 )             28.1     04-29    138  |  104  |  8   ----------------------------<  127<H>  3.4<L>   |  26  |  0.41<L>    Ca    8.4      29 Apr 2022 06:15        Impression:  71yFemale S/p Left Total Knee Replacement POD#1  Plan:  -  Pain management  -  Dvt prophylaxis with Lovenox and venodynes   -  Daily Physical Therapy:  WBAT of the left lower extremity with walker  -  Discharge planning: Home Vs. Rehab   -  Continue with Post-op Antibiotics x 24hrs  -  Dressing change on POD#2  -  Incentive spirometry encouraged.   -  Heel bump to LLE applied   -  Case d/w Dr. Moran

## 2022-04-29 NOTE — DISCHARGE NOTE PROVIDER - NSDCCPTREATMENT_GEN_ALL_CORE_FT
PRINCIPAL PROCEDURE  Procedure: Total left knee replacement  Findings and Treatment: Pain Management- See Attached Medication Reconciliation  **StretchStrap Stretching exercises for Total knee replacement***  Weight Bearing Status: WBAT of the Left knee  Equipment needs: Commode, Walker  Dressing: Please keep bandage/dressing Clean, Dry, and Intact.  May change dressing of right knee q3-4 days with 4x4 gauze, one abdominal pad and a 6inch ACE bandage.  Incision Site: REMOVE STAPLES on 5/13/2022  STAPLES AND DRESSING TO BE REMOVED BY NURSE  NURSE TO APPLY STERI-STRIPS TO THE WOUND AFTER STAPLE REMOVAL   DVT prophylaxis: D/C Lovenox on 5/13/2022  PT/Occupational Therapy are Activities of Daily Living as appropriate  Follow-Up with Dr. Moran in TWO WEEKS at [phone]   Showering allowed once staples are removed in 14 days

## 2022-04-29 NOTE — PROGRESS NOTE ADULT - PROBLEM SELECTOR PLAN 1
Postoperative pain of left knee.   ·  Recommendation: s/p  left TKR POD#1  High risk medications reviewed. Avoid polypharmacy. Avoid IV opioids. Avoid benzodiazepines. Non-pharmacological sleep aides initiated. Non-opioid medications and non-pharmacological pain management measures initiated.  Routine Meds  - Acetaminophen 1 gram po q 6 hours atc for 24 hours  - Gabapentin 100mg po q 12 hours  - Tramadol 50mg po q 8 hours   Mild Pain (Pain Level 1-3)  - Non - pharmacological measures  Moderate Pain (Pain Level 4-6)  - Oxycodone 5mg po q 4 hours prn   Severe Pain ( Pain Level - 7-10)  - Oxycodone 10mg po q 4 hours prn   Bowel Regimen   - Senna and Miralax daily  OOB/PT  Plan discussed with patient and staff  + anemia.

## 2022-04-29 NOTE — PROGRESS NOTE ADULT - SUBJECTIVE AND OBJECTIVE BOX
Source of information: , Chart review  Patient language: English  : n/a    CC:  left knee pain    This is a 71yFemale patient who presents to the hospital for Patient is a 71y old  Female who presents with a chief complaint of Left total knee replacement (29 Apr 2022 11:56)  .     Pt s/p left knee replacement pod#1.  + left knee pain.  Pain on exertion.  Pt is oob with Pt.  No nausea or vomiting  s/p 1 unit of blood.     PAIN SCORE:     5/10    SCALE USED: (1-10 NRS)      PAST MEDICAL & SURGICAL HISTORY:  Peptic ulcer disease    Anemia    Asthma    GERD (gastroesophageal reflux disease)    Vitamin D deficiency    DVT, lower extremity  RLE x 2 (2017 and 2019)    History of fracture of leg  RLE (2021) with hardware implanted        FAMILY HISTORY:  No pertinent family history in first degree relatives          SOCIAL HISTORY:  [x ] Denies Smoking, Alcohol, or Drug Use    Allergies    dust (Unknown)  ibuprofen (Rash)  latex (Rash)  shellfish (Unknown)    Intolerances        MEDICATIONS:    MEDICATIONS  (STANDING):  budesonide 160 MICROgram(s)/formoterol 4.5 MICROgram(s) Inhaler 2 Puff(s) Inhalation two times a day  calcium acetate 667 milliGRAM(s) Oral daily  cholecalciferol 1000 Unit(s) Oral daily  enoxaparin Injectable 30 milliGRAM(s) SubCutaneous every 12 hours  gabapentin 100 milliGRAM(s) Oral two times a day  polyethylene glycol 3350 17 Gram(s) Oral at bedtime  senna 2 Tablet(s) Oral at bedtime  sodium chloride 0.9%. 1000 milliLiter(s) (105 mL/Hr) IV Continuous <Continuous>  traMADol 50 milliGRAM(s) Oral every 8 hours    MEDICATIONS  (PRN):  ondansetron Injectable 4 milliGRAM(s) IV Push every 6 hours PRN Nausea and/or Vomiting  oxyCODONE    IR 5 milliGRAM(s) Oral every 4 hours PRN Moderate Pain (4 - 6)  oxyCODONE    IR 10 milliGRAM(s) Oral every 4 hours PRN Severe Pain (7 - 10)      Vital Signs Last 24 Hrs  T(C): 37 (29 Apr 2022 20:21), Max: 37.1 (28 Apr 2022 23:16)  T(F): 98.6 (29 Apr 2022 20:21), Max: 98.8 (28 Apr 2022 23:16)  HR: 93 (29 Apr 2022 20:21) (75 - 97)  BP: 144/77 (29 Apr 2022 20:21) (119/63 - 148/51)  BP(mean): --  RR: 17 (29 Apr 2022 20:21) (16 - 17)  SpO2: 95% (29 Apr 2022 20:21) (95% - 100%)    LABS:                          8.3    8.40  )-----------( 341      ( 29 Apr 2022 06:15 )             28.1     04-29    138  |  104  |  8   ----------------------------<  127<H>  3.4<L>   |  26  |  0.41<L>    Ca    8.4      29 Apr 2022 06:15            CAPILLARY BLOOD GLUCOSE      POCT Blood Glucose.: 150 mg/dL (29 Apr 2022 05:46)      Radiology:    Drug Screen:        REVIEW OF SYSTEMS:  CONSTITUTIONAL: No fever or fatigue  RESPIRATORY: No cough, wheezing, chills or hemoptysis; No shortness of breath  CARDIOVASCULAR: No chest pain, palpitations, dizziness, or leg swelling  GASTROINTESTINAL: No abdominal or epigastric pain. No nausea, vomiting; No diarrhea or constipation.   GENITOURINARY: No dysuria, frequency, hematuria, retention or incontinence  MUSCULOSKELETAL: + left knee pain  NEURO: No headaches, No numbness/tingling b/l LE, No weakness  PSYCHIATRIC: No depression, anxiety, mood swings, or difficulty sleeping    PHYSICAL EXAM:  GENERAL:  Alert & Oriented X3, NAD, Good concentration  CHEST/LUNG: Clear to auscultation bilaterally; No rales, rhonchi, wheezing, or rubs  HEART: Regular rate and rhythm; No murmurs, rubs, or gallops  ABDOMEN: Soft, Nontender, Nondistended; Bowel sounds present  EXTREMITIES:  2+ Peripheral Pulses, No cyanosis, or edema  MUSCULOSKELETAL: + decreased rom + left knee tenderness   SKIN: No rashes or lesions    Risk factors associated with adverse outcomes related to opioid treatment  [ ]  Concurrent benzodiazepine use  [ ]  History/ Active substance use or alcohol use disorder  [ ] Psychiatric co-morbidity  [ ] Sleep apnea  [ ] COPD  [ ] BMI> 35  [ ] Liver dysfunction  [ ] Renal dysfunction  [ ] CHF  [ ] Smoker  [x ]  Age > 60 years    [ x]  [place]  Reviewed and Copied to Chart. See below.    Plan of care and goal oriented pain management treatment options were discussed with patient and /or primary care giver; all questions and concerns were addressed and care was aligned with patient's wishes.    Educated patient on goal oriented pain management treatment options     04-29-22 @ 21:28

## 2022-04-29 NOTE — DISCHARGE NOTE PROVIDER - CARE PROVIDER_API CALL
Rk Moran)  Orthopaedic Surgery  [address] [place] [address]  Phone: [phone]  Fax: [phone]  Follow Up Time: 2 weeks

## 2022-04-29 NOTE — DISCHARGE NOTE PROVIDER - HOSPITAL COURSE
Patient is a 72 y/o F s/p Left total knee replacement on 4/28/2022. Patient was noted to have acute blood loss anemia and was transfused 1 unit pRBC on 4/28/2022 with no complications. Patient received physical therapy and pain management throughout hospital stay.

## 2022-04-30 LAB
ANION GAP SERPL CALC-SCNC: 5 MMOL/L — SIGNIFICANT CHANGE UP (ref 5–17)
BUN SERPL-MCNC: 7 MG/DL — SIGNIFICANT CHANGE UP (ref 7–18)
CALCIUM SERPL-MCNC: 8.3 MG/DL — LOW (ref 8.4–10.5)
CHLORIDE SERPL-SCNC: 104 MMOL/L — SIGNIFICANT CHANGE UP (ref 96–108)
CO2 SERPL-SCNC: 27 MMOL/L — SIGNIFICANT CHANGE UP (ref 22–31)
CREAT SERPL-MCNC: 0.48 MG/DL — LOW (ref 0.5–1.3)
EGFR: 102 ML/MIN/1.73M2 — SIGNIFICANT CHANGE UP
GLUCOSE SERPL-MCNC: 190 MG/DL — HIGH (ref 70–99)
HCT VFR BLD CALC: 26 % — LOW (ref 34.5–45)
HGB BLD-MCNC: 7.7 G/DL — LOW (ref 11.5–15.5)
MCHC RBC-ENTMCNC: 20.4 PG — LOW (ref 27–34)
MCHC RBC-ENTMCNC: 29.6 GM/DL — LOW (ref 32–36)
MCV RBC AUTO: 68.8 FL — LOW (ref 80–100)
NRBC # BLD: 0 /100 WBCS — SIGNIFICANT CHANGE UP (ref 0–0)
PLATELET # BLD AUTO: 335 K/UL — SIGNIFICANT CHANGE UP (ref 150–400)
POTASSIUM SERPL-MCNC: 3.1 MMOL/L — LOW (ref 3.5–5.3)
POTASSIUM SERPL-SCNC: 3.1 MMOL/L — LOW (ref 3.5–5.3)
RBC # BLD: 3.78 M/UL — LOW (ref 3.8–5.2)
RBC # FLD: 26 % — HIGH (ref 10.3–14.5)
SARS-COV-2 RNA SPEC QL NAA+PROBE: SIGNIFICANT CHANGE UP
SODIUM SERPL-SCNC: 136 MMOL/L — SIGNIFICANT CHANGE UP (ref 135–145)
WBC # BLD: 10.5 K/UL — SIGNIFICANT CHANGE UP (ref 3.8–10.5)
WBC # FLD AUTO: 10.5 K/UL — SIGNIFICANT CHANGE UP (ref 3.8–10.5)

## 2022-04-30 RX ORDER — POTASSIUM CHLORIDE 20 MEQ
10 PACKET (EA) ORAL
Refills: 0 | Status: COMPLETED | OUTPATIENT
Start: 2022-04-30 | End: 2022-04-30

## 2022-04-30 RX ADMIN — Medication 100 MILLIEQUIVALENT(S): at 14:27

## 2022-04-30 RX ADMIN — Medication 667 MILLIGRAM(S): at 12:12

## 2022-04-30 RX ADMIN — ENOXAPARIN SODIUM 30 MILLIGRAM(S): 100 INJECTION SUBCUTANEOUS at 10:12

## 2022-04-30 RX ADMIN — TRAMADOL HYDROCHLORIDE 50 MILLIGRAM(S): 50 TABLET ORAL at 22:43

## 2022-04-30 RX ADMIN — Medication 100 MILLIEQUIVALENT(S): at 13:23

## 2022-04-30 RX ADMIN — GABAPENTIN 100 MILLIGRAM(S): 400 CAPSULE ORAL at 17:32

## 2022-04-30 RX ADMIN — BUDESONIDE AND FORMOTEROL FUMARATE DIHYDRATE 2 PUFF(S): 160; 4.5 AEROSOL RESPIRATORY (INHALATION) at 10:13

## 2022-04-30 RX ADMIN — TRAMADOL HYDROCHLORIDE 50 MILLIGRAM(S): 50 TABLET ORAL at 14:29

## 2022-04-30 RX ADMIN — BUDESONIDE AND FORMOTEROL FUMARATE DIHYDRATE 2 PUFF(S): 160; 4.5 AEROSOL RESPIRATORY (INHALATION) at 23:32

## 2022-04-30 RX ADMIN — TRAMADOL HYDROCHLORIDE 50 MILLIGRAM(S): 50 TABLET ORAL at 14:30

## 2022-04-30 RX ADMIN — GABAPENTIN 100 MILLIGRAM(S): 400 CAPSULE ORAL at 05:28

## 2022-04-30 RX ADMIN — Medication 100 MILLIEQUIVALENT(S): at 12:13

## 2022-04-30 RX ADMIN — SODIUM CHLORIDE 105 MILLILITER(S): 9 INJECTION INTRAMUSCULAR; INTRAVENOUS; SUBCUTANEOUS at 12:14

## 2022-04-30 RX ADMIN — Medication 1000 UNIT(S): at 12:12

## 2022-04-30 RX ADMIN — OXYCODONE HYDROCHLORIDE 5 MILLIGRAM(S): 5 TABLET ORAL at 03:24

## 2022-04-30 RX ADMIN — TRAMADOL HYDROCHLORIDE 50 MILLIGRAM(S): 50 TABLET ORAL at 21:43

## 2022-04-30 RX ADMIN — SODIUM CHLORIDE 105 MILLILITER(S): 9 INJECTION INTRAMUSCULAR; INTRAVENOUS; SUBCUTANEOUS at 03:23

## 2022-04-30 RX ADMIN — OXYCODONE HYDROCHLORIDE 5 MILLIGRAM(S): 5 TABLET ORAL at 20:20

## 2022-04-30 RX ADMIN — ENOXAPARIN SODIUM 30 MILLIGRAM(S): 100 INJECTION SUBCUTANEOUS at 21:43

## 2022-04-30 NOTE — PROGRESS NOTE ADULT - SUBJECTIVE AND OBJECTIVE BOX
Orthopedic Surgery     71yFemale    Diagnosis:  S/p LEFT Total Knee Replacement POD#2    24hr interval:   - acute blood loss anemia, post-operative  Patient was seen and evaluated at bedside. Patient with no acute complaints.   Pain is  well controlled.     Denies CP/SOB, dyspnea, paresthesias, N/V/D, palpitations.     Vital Signs Last 24 Hrs  T(C): 37.4 (30 Apr 2022 09:15), Max: 37.7 (30 Apr 2022 05:12)  T(F): 99.4 (30 Apr 2022 09:15), Max: 99.9 (30 Apr 2022 05:12)  HR: 99 (30 Apr 2022 09:15) (84 - 109)  BP: 145/76 (30 Apr 2022 09:15) (119/63 - 148/51)  BP(mean): --  RR: 18 (30 Apr 2022 09:15) (16 - 18)  SpO2: 96% (30 Apr 2022 09:15) (94% - 97%)  I&O's Detail      Physical Exam:    General: AAOx3, NAD, resting comfortably in bed.  MSK:  Left knee Dressing changed  Wound is clean, dry, with staples intact.   No erythema. Skin pink and warm.    No wound drainage.   Lower extremities:  No calf tenderness, calves are soft.   2+pulses. NVI.  5/5 Strength of EHL/TA/gastrocnemius B/L.    Good capillary refill. SILT.                          7.7    10.50 )-----------( 335      ( 30 Apr 2022 05:39 )             26.0     04-30    136  |  104  |  7   ----------------------------<  190<H>  3.1<L>   |  27  |  0.48<L>    Ca    8.3<L>      30 Apr 2022 05:39        Impression:  71yFemale S/p Left Total Knee Replacement POD#2 - acute blood loss anemia, post-operative  Plan:  #ANEMIA -[ acute blood loss anemia, post-operative]  - transfuse 1 u prbc      -  Pain management  -  Dvt prophylaxis with Lovenox  -  Daily Physical Therapy:  WBAT of the left lower extremity with walker  -  Discharge planning: Home   -  Case d/w Dr. Moran  -  Dressing changed today, new dressing applied. Orthopedic Surgery     71yFemale    Diagnosis:  S/p LEFT Total Knee Replacement POD#2    24hr interval:   - acute blood loss anemia, post-operative  Patient was seen and evaluated at bedside. Patient with no acute complaints.   Pain is  well controlled.     Denies CP/SOB, dyspnea, paresthesias, N/V/D, palpitations.     Vital Signs Last 24 Hrs  T(C): 37.4 (30 Apr 2022 09:15), Max: 37.7 (30 Apr 2022 05:12)  T(F): 99.4 (30 Apr 2022 09:15), Max: 99.9 (30 Apr 2022 05:12)  HR: 99 (30 Apr 2022 09:15) (84 - 109)  BP: 145/76 (30 Apr 2022 09:15) (119/63 - 148/51)  BP(mean): --  RR: 18 (30 Apr 2022 09:15) (16 - 18)  SpO2: 96% (30 Apr 2022 09:15) (94% - 97%)  I&O's Detail      Physical Exam:    General: AAOx3, NAD, resting comfortably in bed.  MSK:  Left knee Dressing changed  Wound is clean, dry, with staples intact.   No erythema. Skin pink and warm.    No wound drainage.   Lower extremities:  No calf tenderness, calves are soft.   2+pulses. NVI.  5/5 Strength of EHL/TA/gastrocnemius B/L.    Good capillary refill. SILT.                          7.7    10.50 )-----------( 335      ( 30 Apr 2022 05:39 )             26.0     04-30    136  |  104  |  7   ----------------------------<  190<H>  3.1<L>   |  27  |  0.48<L>    Ca    8.3<L>      30 Apr 2022 05:39        Impression:  71yFemale S/p Left Total Knee Replacement POD#2 - acute blood loss anemia, post-operative  Plan:  #ANEMIA -[ acute blood loss anemia, post-operative]  - transfuse 1 u prbc    #Hypokalemia  - kcl riders     -  Pain management  -  Dvt prophylaxis with Lovenox  -  Daily Physical Therapy:  WBAT of the left lower extremity with walker  -  Discharge planning: Home   -  Case d/w Dr. Moran  -  Dressing changed today, new dressing applied.

## 2022-05-01 VITALS
SYSTOLIC BLOOD PRESSURE: 133 MMHG | HEART RATE: 88 BPM | TEMPERATURE: 99 F | RESPIRATION RATE: 18 BRPM | OXYGEN SATURATION: 97 % | DIASTOLIC BLOOD PRESSURE: 68 MMHG

## 2022-05-01 LAB
ANION GAP SERPL CALC-SCNC: 5 MMOL/L — SIGNIFICANT CHANGE UP (ref 5–17)
BUN SERPL-MCNC: 9 MG/DL — SIGNIFICANT CHANGE UP (ref 7–18)
CALCIUM SERPL-MCNC: 8.3 MG/DL — LOW (ref 8.4–10.5)
CHLORIDE SERPL-SCNC: 105 MMOL/L — SIGNIFICANT CHANGE UP (ref 96–108)
CO2 SERPL-SCNC: 29 MMOL/L — SIGNIFICANT CHANGE UP (ref 22–31)
CREAT SERPL-MCNC: 0.41 MG/DL — LOW (ref 0.5–1.3)
EGFR: 106 ML/MIN/1.73M2 — SIGNIFICANT CHANGE UP
GLUCOSE SERPL-MCNC: 95 MG/DL — SIGNIFICANT CHANGE UP (ref 70–99)
HCT VFR BLD CALC: 29.1 % — LOW (ref 34.5–45)
HGB BLD-MCNC: 8.9 G/DL — LOW (ref 11.5–15.5)
MCHC RBC-ENTMCNC: 21.7 PG — LOW (ref 27–34)
MCHC RBC-ENTMCNC: 30.6 GM/DL — LOW (ref 32–36)
MCV RBC AUTO: 71 FL — LOW (ref 80–100)
NRBC # BLD: 0 /100 WBCS — SIGNIFICANT CHANGE UP (ref 0–0)
PLATELET # BLD AUTO: 308 K/UL — SIGNIFICANT CHANGE UP (ref 150–400)
POTASSIUM SERPL-MCNC: 3.5 MMOL/L — SIGNIFICANT CHANGE UP (ref 3.5–5.3)
POTASSIUM SERPL-SCNC: 3.5 MMOL/L — SIGNIFICANT CHANGE UP (ref 3.5–5.3)
RBC # BLD: 4.1 M/UL — SIGNIFICANT CHANGE UP (ref 3.8–5.2)
RBC # FLD: 26 % — HIGH (ref 10.3–14.5)
SODIUM SERPL-SCNC: 139 MMOL/L — SIGNIFICANT CHANGE UP (ref 135–145)
WBC # BLD: 7.79 K/UL — SIGNIFICANT CHANGE UP (ref 3.8–10.5)
WBC # FLD AUTO: 7.79 K/UL — SIGNIFICANT CHANGE UP (ref 3.8–10.5)

## 2022-05-01 PROCEDURE — 86923 COMPATIBILITY TEST ELECTRIC: CPT

## 2022-05-01 PROCEDURE — 94640 AIRWAY INHALATION TREATMENT: CPT

## 2022-05-01 PROCEDURE — 97530 THERAPEUTIC ACTIVITIES: CPT

## 2022-05-01 PROCEDURE — 86901 BLOOD TYPING SEROLOGIC RH(D): CPT

## 2022-05-01 PROCEDURE — 88311 DECALCIFY TISSUE: CPT

## 2022-05-01 PROCEDURE — 86900 BLOOD TYPING SEROLOGIC ABO: CPT

## 2022-05-01 PROCEDURE — 80048 BASIC METABOLIC PNL TOTAL CA: CPT

## 2022-05-01 PROCEDURE — C1713: CPT

## 2022-05-01 PROCEDURE — 36430 TRANSFUSION BLD/BLD COMPNT: CPT

## 2022-05-01 PROCEDURE — 87635 SARS-COV-2 COVID-19 AMP PRB: CPT

## 2022-05-01 PROCEDURE — 88305 TISSUE EXAM BY PATHOLOGIST: CPT

## 2022-05-01 PROCEDURE — 85027 COMPLETE CBC AUTOMATED: CPT

## 2022-05-01 PROCEDURE — 97116 GAIT TRAINING THERAPY: CPT

## 2022-05-01 PROCEDURE — 36415 COLL VENOUS BLD VENIPUNCTURE: CPT

## 2022-05-01 PROCEDURE — 82962 GLUCOSE BLOOD TEST: CPT

## 2022-05-01 PROCEDURE — 73562 X-RAY EXAM OF KNEE 3: CPT

## 2022-05-01 PROCEDURE — 97162 PT EVAL MOD COMPLEX 30 MIN: CPT

## 2022-05-01 PROCEDURE — C1776: CPT

## 2022-05-01 PROCEDURE — 86850 RBC ANTIBODY SCREEN: CPT

## 2022-05-01 PROCEDURE — 97110 THERAPEUTIC EXERCISES: CPT

## 2022-05-01 PROCEDURE — P9040: CPT

## 2022-05-01 RX ORDER — SENNA PLUS 8.6 MG/1
2 TABLET ORAL
Qty: 14 | Refills: 0
Start: 2022-05-01 | End: 2022-05-07

## 2022-05-01 RX ORDER — POLYETHYLENE GLYCOL 3350 17 G/17G
17 POWDER, FOR SOLUTION ORAL
Qty: 5 | Refills: 0
Start: 2022-05-01

## 2022-05-01 RX ORDER — FERROUS SULFATE 325(65) MG
1 TABLET ORAL
Qty: 30 | Refills: 0
Start: 2022-05-01

## 2022-05-01 RX ORDER — PANTOPRAZOLE SODIUM 20 MG/1
1 TABLET, DELAYED RELEASE ORAL
Qty: 14 | Refills: 0
Start: 2022-05-01 | End: 2022-05-14

## 2022-05-01 RX ADMIN — GABAPENTIN 100 MILLIGRAM(S): 400 CAPSULE ORAL at 05:40

## 2022-05-01 RX ADMIN — TRAMADOL HYDROCHLORIDE 50 MILLIGRAM(S): 50 TABLET ORAL at 05:41

## 2022-05-01 RX ADMIN — ENOXAPARIN SODIUM 30 MILLIGRAM(S): 100 INJECTION SUBCUTANEOUS at 12:37

## 2022-05-01 RX ADMIN — Medication 667 MILLIGRAM(S): at 12:37

## 2022-05-01 RX ADMIN — Medication 1000 UNIT(S): at 12:36

## 2022-05-01 RX ADMIN — BUDESONIDE AND FORMOTEROL FUMARATE DIHYDRATE 2 PUFF(S): 160; 4.5 AEROSOL RESPIRATORY (INHALATION) at 10:30

## 2022-05-01 RX ADMIN — TRAMADOL HYDROCHLORIDE 50 MILLIGRAM(S): 50 TABLET ORAL at 06:06

## 2022-05-01 NOTE — DISCHARGE NOTE NURSING/CASE MANAGEMENT/SOCIAL WORK - PATIENT PORTAL LINK FT
You can access the FollowMyHealth Patient Portal offered by [facility] by registering at the following website: [email or web address]. By joining [employer] [employer]’s FollowMyHealth portal, you will also be able to view your health information using other applications (apps) compatible with our system.

## 2022-05-01 NOTE — DISCHARGE NOTE NURSING/CASE MANAGEMENT/SOCIAL WORK - NSDCPEFALRISK_GEN_ALL_CORE
For information on Fall & Injury Prevention, visit: [email or web address] OR  [email or web address] OR  https://www.cdc.gov/steadi/patient.html

## 2022-05-01 NOTE — PROGRESS NOTE ADULT - SUBJECTIVE AND OBJECTIVE BOX
Orthopedic Surgery     71yFemale    Diagnosis:  S/p LEFT Total Knee Replacement POD#3    24hr interval:  - s/p 1u prbc on saturday 4/30/22     Patient was seen and evaluated at bedside. Patient with no acute complaints.   Pain is  well controlled.     Denies CP/SOB, dyspnea, paresthesias, N/V/D, palpitations.     Vital Signs Last 24 Hrs  T(C): 37.4 (01 May 2022 05:08), Max: 37.7 (30 Apr 2022 21:08)  T(F): 99.4 (01 May 2022 05:08), Max: 99.8 (30 Apr 2022 21:08)  HR: 86 (01 May 2022 05:08) (86 - 109)  BP: 129/64 (01 May 2022 05:08) (129/58 - 151/63)  BP(mean): 82 (30 Apr 2022 21:08) (82 - 82)  RR: 18 (01 May 2022 05:08) (18 - 18)  SpO2: 96% (01 May 2022 05:08) (96% - 96%)  I&O's Detail      Physical Exam:    General: AAOx3, NAD, resting comfortably in bed.  MSK:  Left knee Dressing changed  Wound is clean, dry, with staples intact.   No erythema. Skin pink and warm.    No wound drainage.   Lower extremities:  No calf tenderness, calves are soft.   2+pulses. NVI.  5/5 Strength of EHL/TA/gastrocnemius B/L.    Good capillary refill. SILT.               labs:      Impression:  71yFemale S/p Left Total Knee Replacement POD#3  Plan:  -  Pain management  -  Dvt prophylaxis with Lovenox  -  Daily Physical Therapy:  WBAT of the left lower extremity with walker  -  Discharge planning: Home today  -  Case d/w Dr. Moran  -  Dressing changed today, new dressing applied. Orthopedic Surgery     71yFemale    Diagnosis:  S/p LEFT Total Knee Replacement POD#3    24hr interval:  - s/p 1u prbc on saturday 4/30/22     Patient was seen and evaluated at bedside. Patient with no acute complaints.   Pain is  well controlled.     Denies CP/SOB, dyspnea, paresthesias, N/V/D, palpitations.     Vital Signs Last 24 Hrs  T(C): 37.4 (01 May 2022 05:08), Max: 37.7 (30 Apr 2022 21:08)  T(F): 99.4 (01 May 2022 05:08), Max: 99.8 (30 Apr 2022 21:08)  HR: 86 (01 May 2022 05:08) (86 - 109)  BP: 129/64 (01 May 2022 05:08) (129/58 - 151/63)  BP(mean): 82 (30 Apr 2022 21:08) (82 - 82)  RR: 18 (01 May 2022 05:08) (18 - 18)  SpO2: 96% (01 May 2022 05:08) (96% - 96%)  I&O's Detail      Physical Exam:    General: AAOx3, NAD, resting comfortably in bed.  MSK:  Left knee Dressing changed  Wound is clean, dry, with staples intact.   No erythema. Skin pink and warm.    No wound drainage.   Lower extremities:  No calf tenderness, calves are soft.   2+pulses. NVI.  5/5 Strength of EHL/TA/gastrocnemius B/L.    Good capillary refill. SILT.               labs:                        8.9    7.79  )-----------( 308      ( 01 May 2022 05:54 )             29.1   05-01    139  |  105  |  9   ----------------------------<  95  3.5   |  29  |  0.41<L>    Ca    8.3<L>      01 May 2022 05:54        Impression:  71yFemale S/p Left Total Knee Replacement POD#3  Plan:  -  Pain management  -  Dvt prophylaxis with Lovenox  -  Daily Physical Therapy:  WBAT of the left lower extremity with walker  -  Discharge planning: Home today  -  Case d/w Dr. Moran  -  Dressing changed today, new dressing applied.

## 2022-05-05 LAB — SURGICAL PATHOLOGY STUDY: SIGNIFICANT CHANGE UP

## 2023-04-13 NOTE — BRIEF OPERATIVE NOTE - SPECIMENS
You will be called to schedule the stress echo. This can be completed in Fall (Oct-Nov). Please hold Toprol-XL 48 hours prior to the test.   
n/a

## 2023-08-23 NOTE — PHYSICAL THERAPY INITIAL EVALUATION ADULT - SITTING BALANCE: STATIC
NP Note discussed with  primary attending    Patient is a 89y old  Female who presents with a chief complaint of r/o GI bleed (23 Aug 2023 13:39)      INTERVAL HPI/OVERNIGHT EVENTS: no new complaints, pt seen at bedside. Pending placement with palliative     MEDICATIONS  (STANDING):  allopurinol 100 milliGRAM(s) Oral daily  ascorbic acid 500 milliGRAM(s) Oral daily  atropine 1% Solution 1 Drop(s) Left EYE two times a day  donepezil 5 milliGRAM(s) Oral at bedtime  dorzolamide 2%/timolol 0.5% Ophthalmic Solution 1 Drop(s) Left EYE two times a day  ferrous    sulfate 325 milliGRAM(s) Oral daily  hydrocortisone 2.5% Lotion 1 Application(s) Topical two times a day  latanoprost 0.005% Ophthalmic Solution 1 Drop(s) Left EYE at bedtime  levothyroxine 112 MICROGram(s) Oral daily  liothyronine 5 MICROGram(s) Oral daily  melatonin 3 milliGRAM(s) Oral at bedtime  pantoprazole    Tablet 40 milliGRAM(s) Oral before breakfast  prednisoLONE acetate 1% Suspension 1 Drop(s) Left EYE three times a day  senna 1 Tablet(s) Oral daily  tiotropium 2.5 MICROgram(s) Inhaler 2 Puff(s) Inhalation daily    MEDICATIONS  (PRN):      __________________________________________________  REVIEW OF SYSTEMS:    Limited ROS due to mental status       Vital Signs Last 24 Hrs  T(C): 36.9 (23 Aug 2023 09:00), Max: 37.9 (23 Aug 2023 05:10)  T(F): 98.4 (23 Aug 2023 09:00), Max: 100.3 (23 Aug 2023 05:10)  HR: 81 (23 Aug 2023 10:26) (67 - 81)  BP: 117/75 (23 Aug 2023 10:26) (113/62 - 136/77)  BP(mean): --  RR: 18 (23 Aug 2023 05:10) (16 - 18)  SpO2: 94% (23 Aug 2023 10:26) (93% - 96%)    Parameters below as of 23 Aug 2023 10:26  Patient On (Oxygen Delivery Method): room air        ________________________________________________  PHYSICAL EXAM:  GENERAL: NAD  HEENT: Normocephalic;  conjunctivae and sclerae clear; moist mucous membranes;   NECK : supple  CHEST/LUNG: Clear to ausculitation bilaterally with good air entry   HEART: S1 S2  regular; no murmurs, gallops or rubs  ABDOMEN: Soft, Nontender, Nondistended; Bowel sounds present  EXTREMITIES: no cyanosis; no edema; no calf tenderness  SKIN: warm and dry; no rash  NERVOUS SYSTEM:  Awake and alert; Oriented  to place, person and time ; no new deficits    _________________________________________________  LABS:                        10.0   9.98  )-----------( 432      ( 22 Aug 2023 06:10 )             33.0     08-22    140  |  109<H>  |  21<H>  ----------------------------<  103<H>  4.2   |  26  |  1.03    Ca    8.7      22 Aug 2023 06:10    TPro  6.3  /  Alb  2.4<L>  /  TBili  0.3  /  DBili  x   /  AST  13  /  ALT  8<L>  /  AlkPhos  56  08-22      Urinalysis Basic - ( 22 Aug 2023 06:10 )    Color: x / Appearance: x / SG: x / pH: x  Gluc: 103 mg/dL / Ketone: x  / Bili: x / Urobili: x   Blood: x / Protein: x / Nitrite: x   Leuk Esterase: x / RBC: x / WBC x   Sq Epi: x / Non Sq Epi: x / Bacteria: x      CAPILLARY BLOOD GLUCOSE            RADIOLOGY & ADDITIONAL TESTS:  < from: US Duplex Venous Lower Ext Complete, Bilateral (08.20.23 @ 15:09) >  MPRESSION:  No evidence of deep venous thrombosis in either lower extremity.    < end of copied text >      Imaging Personally Reviewed:  YES/NO    Consultant(s) Notes Reviewed:   YES/ No    Care Discussed with Consultants :     Plan of care was discussed with patient and /or primary care giver; all questions and concerns were addressed and care was aligned with patient's wishes.     good balance

## 2024-01-27 NOTE — ED ADULT NURSE NOTE - NSIMPLEMENTINTERV_GEN_ALL_ED
Implemented All Fall with Harm Risk Interventions:  [place] to call system. Call bell, personal items and telephone within reach. Instruct patient to call for assistance. Room bathroom lighting operational. Non-slip footwear when patient is off stretcher. Physically safe environment: no spills, clutter or unnecessary equipment. Stretcher in lowest position, wheels locked, appropriate side rails in place. Provide visual cue, wrist band, yellow gown, etc. Monitor gait and stability. Monitor for mental status changes and reorient to person, place, and time. Review medications for side effects contributing to fall risk. Reinforce activity limits and safety measures with patient and family. Provide visual clues: red socks. no

## 2024-06-01 PROBLEM — E55.9 VITAMIN D DEFICIENCY, UNSPECIFIED: Chronic | Status: ACTIVE | Noted: 2022-04-20

## 2024-06-01 PROBLEM — I82.409 ACUTE EMBOLISM AND THROMBOSIS OF UNSPECIFIED DEEP VEINS OF UNSPECIFIED LOWER EXTREMITY: Chronic | Status: ACTIVE | Noted: 2022-04-20

## 2024-06-01 PROBLEM — K21.9 GASTRO-ESOPHAGEAL REFLUX DISEASE WITHOUT ESOPHAGITIS: Chronic | Status: ACTIVE | Noted: 2022-04-20

## 2024-06-05 ENCOUNTER — OUTPATIENT (OUTPATIENT)
Dept: OUTPATIENT SERVICES | Facility: HOSPITAL | Age: 74
LOS: 1 days | End: 2024-06-05
Payer: COMMERCIAL

## 2024-06-05 VITALS
OXYGEN SATURATION: 98 % | SYSTOLIC BLOOD PRESSURE: 174 MMHG | WEIGHT: 145.95 LBS | RESPIRATION RATE: 16 BRPM | HEIGHT: 61 IN | TEMPERATURE: 98 F | DIASTOLIC BLOOD PRESSURE: 84 MMHG | HEART RATE: 70 BPM

## 2024-06-05 DIAGNOSIS — Z01.812 ENCOUNTER FOR PREPROCEDURAL LABORATORY EXAMINATION: ICD-10-CM

## 2024-06-05 DIAGNOSIS — J45.909 UNSPECIFIED ASTHMA, UNCOMPLICATED: ICD-10-CM

## 2024-06-05 DIAGNOSIS — M17.11 UNILATERAL PRIMARY OSTEOARTHRITIS, RIGHT KNEE: ICD-10-CM

## 2024-06-05 DIAGNOSIS — Z87.81 PERSONAL HISTORY OF (HEALED) TRAUMATIC FRACTURE: Chronic | ICD-10-CM

## 2024-06-05 DIAGNOSIS — K21.9 GASTRO-ESOPHAGEAL REFLUX DISEASE WITHOUT ESOPHAGITIS: ICD-10-CM

## 2024-06-05 DIAGNOSIS — Z96.652 PRESENCE OF LEFT ARTIFICIAL KNEE JOINT: Chronic | ICD-10-CM

## 2024-06-05 LAB
A1C WITH ESTIMATED AVERAGE GLUCOSE RESULT: 6.4 % — HIGH (ref 4–5.6)
BLD GP AB SCN SERPL QL: SIGNIFICANT CHANGE UP
ESTIMATED AVERAGE GLUCOSE: 137 MG/DL — HIGH (ref 68–114)
MRSA PCR RESULT.: SIGNIFICANT CHANGE UP
S AUREUS DNA NOSE QL NAA+PROBE: SIGNIFICANT CHANGE UP

## 2024-06-05 RX ORDER — ONDANSETRON 8 MG/1
1 TABLET, FILM COATED ORAL
Qty: 0 | Refills: 0 | DISCHARGE

## 2024-06-05 RX ORDER — SODIUM CHLORIDE 0.9 % (FLUSH) 0.9 %
3 SYRINGE (ML) INJECTION EVERY 8 HOURS
Refills: 0 | Status: DISCONTINUED | OUTPATIENT
Start: 2024-06-20 | End: 2024-06-24

## 2024-06-05 RX ORDER — OXYCODONE AND ACETAMINOPHEN 5; 325 MG/1; MG/1
1 TABLET ORAL
Qty: 0 | Refills: 0 | DISCHARGE

## 2024-06-05 RX ORDER — CHOLECALCIFEROL (VITAMIN D3) 125 MCG
1 CAPSULE ORAL
Qty: 0 | Refills: 0 | DISCHARGE

## 2024-06-05 RX ORDER — NALOXEGOL OXALATE 12.5 MG/1
1 TABLET, FILM COATED ORAL
Qty: 0 | Refills: 0 | DISCHARGE

## 2024-06-05 RX ORDER — FLUTICASONE PROPIONATE AND SALMETEROL 50; 250 UG/1; UG/1
1 POWDER ORAL; RESPIRATORY (INHALATION)
Qty: 0 | Refills: 0 | DISCHARGE

## 2024-06-05 RX ORDER — ENOXAPARIN SODIUM 100 MG/ML
30 INJECTION SUBCUTANEOUS
Qty: 0 | Refills: 0 | DISCHARGE

## 2024-06-05 NOTE — H&P PST ADULT - HISTORY OF PRESENT ILLNESS
73  y.o female with PMHx for GERD, Asthma, DVT, lower extremity RLE x 2 (2017 and 2019), anemia (on Iron infusion), ostearthritis left knee, s/p LTKR 4/2024. Now with c/o of right knee pain, on w/u found to have Unilateral primary osteoarthritis of right knee and now for schedule Right Knee Replacement on 6/20/24 with Dr Moran 73  y.o female with PMHx for GERD, Asthma, DVT, lower extremity RLE x 2 (2017 and 2019), anemia (on Iron infusion), ostearthritis left knee, s/p LTKR 4/2023. Now with c/o of right knee pain, on w/u found to have Unilateral primary osteoarthritis of right knee and now for schedule Right Knee Replacement on 6/20/24 with Dr Moran

## 2024-06-05 NOTE — H&P PST ADULT - NSICDXPASTSURGICALHX_GEN_ALL_CORE_FT
PAST SURGICAL HISTORY:  History of fracture of leg RLE (2021) with hardware implanted    S/P total knee replacement, left

## 2024-06-05 NOTE — H&P PST ADULT - ALLERGY TYPES
Patient/Caregiver provided printed discharge information.
ibuprofen /latex/dust/reactions to medicines

## 2024-06-05 NOTE — H&P PST ADULT - REASON FOR ADMISSION
Right Knee Replacement on 6/20/24 with Dr Moran im getting my Right Knee Replacement on 6/20/24 with Dr Moran

## 2024-06-05 NOTE — H&P PST ADULT - NSICDXPASTMEDICALHX_GEN_ALL_CORE_FT
PAST MEDICAL HISTORY:  Anemia     Asthma     DVT, lower extremity RLE x 2 (2017 and 2019)    GERD (gastroesophageal reflux disease)     Peptic ulcer disease     Vitamin D deficiency      PAST MEDICAL HISTORY:  Anemia     Asthma     DVT, lower extremity RLE x 2 (2017 and 2019)    GERD (gastroesophageal reflux disease)     Peptic ulcer disease     Unilateral primary osteoarthritis, right knee     Vitamin D deficiency

## 2024-06-05 NOTE — H&P PST ADULT - PROBLEM SELECTOR PLAN 1
Pt schedule for Right Knee Replacement on 6/20/24 with Dr Moran    Labs drawn in PCP - will /fu report   Pt was seen by PCP for Medical clearance- will f/u report     pt was swabbed for MRSA/MSSA -will f/u with result   Pt Instructed on use of Mupirocin if nasal swab positive    Pt was  instructed to stop aspirin/ecotrin and all over the counter medication including vitamins and herbal supplements one week prior to surgery   Instructions given on the use of 4% chlorhexidine wash and Pt verbalized understanding of same   Pt Instructed to have nothing by mouth starting midnight day before surgery  Patient is to expect a phone call day before surgery between the hours of 430- 630pm giving arrival time for surgery   Written and verbal preoperative instructions given to patient with understanding verbalized.   Post surgery instruction given by Pola, booklets and pamphlets also given    Patient today with STOP bang score 3  Low  risk for FLORINDA Pt schedule for Right Knee Replacement on 6/20/24 with Dr Moran    Labs drawn in PCP - will /fu report   Pt was seen by PCP for Medical clearance- will f/u report     pt was swabbed for MRSA/MSSA -will f/u with result   Pt Instructed on use of Mupirocin if nasal swab positive    Pt was  instructed to stop aspirin/ecotrin and all over the counter medication including vitamins and herbal supplements one week prior to surgery   Instructions given on the use of 4% chlorhexidine wash and Pt verbalized understanding of same   Pt Instructed to have nothing by mouth starting midnight day before surgery  Patient is to expect a phone call day before surgery between the hours of 430- 630pm giving arrival time for surgery   Written and verbal preoperative instructions given to patient with understanding verbalized.   Post surgery instruction given by Pola, booklets and pamphlets also given    Patient today with STOP bang score 1  Low  risk for FLORINDA

## 2024-06-05 NOTE — H&P PST ADULT - ASSESSMENT
73  y.o female with PMHx for GERD, Asthma, DVT, lower extremity RLE x 2 (2017 and 2019), anemia (on Iron infusion), ostearthritis left knee, s/p LTKR 4/2024. Now with c/o of right knee pain, on w/u found to have Unilateral primary osteoarthritis of right knee and now for schedule Right Knee Replacement on 6/20/24 with Dr Moran 73  y.o female with Unilateral primary osteoarthritis of right knee and now for schedule Right Knee Replacement on 6/20/24 with Dr Dean QUIROS 1

## 2024-06-06 PROCEDURE — 86850 RBC ANTIBODY SCREEN: CPT

## 2024-06-06 PROCEDURE — 36415 COLL VENOUS BLD VENIPUNCTURE: CPT

## 2024-06-06 PROCEDURE — 86900 BLOOD TYPING SEROLOGIC ABO: CPT

## 2024-06-06 PROCEDURE — 83036 HEMOGLOBIN GLYCOSYLATED A1C: CPT

## 2024-06-06 PROCEDURE — G0463: CPT

## 2024-06-06 PROCEDURE — 87640 STAPH A DNA AMP PROBE: CPT

## 2024-06-06 PROCEDURE — 87641 MR-STAPH DNA AMP PROBE: CPT

## 2024-06-06 PROCEDURE — 86901 BLOOD TYPING SEROLOGIC RH(D): CPT

## 2024-06-20 ENCOUNTER — INPATIENT (INPATIENT)
Facility: HOSPITAL | Age: 74
LOS: 3 days | Discharge: HOME HEALTH | DRG: 470 | End: 2024-06-24
Attending: ORTHOPAEDIC SURGERY | Admitting: ORTHOPAEDIC SURGERY
Payer: COMMERCIAL

## 2024-06-20 VITALS
RESPIRATION RATE: 17 BRPM | OXYGEN SATURATION: 94 % | HEIGHT: 61 IN | WEIGHT: 145.95 LBS | HEART RATE: 67 BPM | DIASTOLIC BLOOD PRESSURE: 67 MMHG | SYSTOLIC BLOOD PRESSURE: 115 MMHG | TEMPERATURE: 98 F

## 2024-06-20 DIAGNOSIS — Z96.652 PRESENCE OF LEFT ARTIFICIAL KNEE JOINT: Chronic | ICD-10-CM

## 2024-06-20 DIAGNOSIS — Z96.651 PRESENCE OF RIGHT ARTIFICIAL KNEE JOINT: ICD-10-CM

## 2024-06-20 DIAGNOSIS — M17.11 UNILATERAL PRIMARY OSTEOARTHRITIS, RIGHT KNEE: ICD-10-CM

## 2024-06-20 DIAGNOSIS — G89.18 OTHER ACUTE POSTPROCEDURAL PAIN: ICD-10-CM

## 2024-06-20 DIAGNOSIS — Z87.81 PERSONAL HISTORY OF (HEALED) TRAUMATIC FRACTURE: Chronic | ICD-10-CM

## 2024-06-20 LAB
ANION GAP SERPL CALC-SCNC: 6 MMOL/L — SIGNIFICANT CHANGE UP (ref 5–17)
BLD GP AB SCN SERPL QL: SIGNIFICANT CHANGE UP
BUN SERPL-MCNC: 13 MG/DL — SIGNIFICANT CHANGE UP (ref 7–18)
CALCIUM SERPL-MCNC: 8.6 MG/DL — SIGNIFICANT CHANGE UP (ref 8.4–10.5)
CHLORIDE SERPL-SCNC: 111 MMOL/L — HIGH (ref 96–108)
CO2 SERPL-SCNC: 25 MMOL/L — SIGNIFICANT CHANGE UP (ref 22–31)
CREAT SERPL-MCNC: 0.66 MG/DL — SIGNIFICANT CHANGE UP (ref 0.5–1.3)
EGFR: 94 ML/MIN/1.73M2 — SIGNIFICANT CHANGE UP
GLUCOSE BLDC GLUCOMTR-MCNC: 101 MG/DL — HIGH (ref 70–99)
GLUCOSE BLDC GLUCOMTR-MCNC: 120 MG/DL — HIGH (ref 70–99)
GLUCOSE SERPL-MCNC: 123 MG/DL — HIGH (ref 70–99)
HCT VFR BLD CALC: 39.8 % — SIGNIFICANT CHANGE UP (ref 34.5–45)
HGB BLD-MCNC: 12.2 G/DL — SIGNIFICANT CHANGE UP (ref 11.5–15.5)
MCHC RBC-ENTMCNC: 25.7 PG — LOW (ref 27–34)
MCHC RBC-ENTMCNC: 30.7 GM/DL — LOW (ref 32–36)
MCV RBC AUTO: 84 FL — SIGNIFICANT CHANGE UP (ref 80–100)
NRBC # BLD: 0 /100 WBCS — SIGNIFICANT CHANGE UP (ref 0–0)
PLATELET # BLD AUTO: 305 K/UL — SIGNIFICANT CHANGE UP (ref 150–400)
POTASSIUM SERPL-MCNC: 3.6 MMOL/L — SIGNIFICANT CHANGE UP (ref 3.5–5.3)
POTASSIUM SERPL-SCNC: 3.6 MMOL/L — SIGNIFICANT CHANGE UP (ref 3.5–5.3)
RBC # BLD: 4.74 M/UL — SIGNIFICANT CHANGE UP (ref 3.8–5.2)
RBC # FLD: 23.2 % — HIGH (ref 10.3–14.5)
SODIUM SERPL-SCNC: 142 MMOL/L — SIGNIFICANT CHANGE UP (ref 135–145)
WBC # BLD: 6.51 K/UL — SIGNIFICANT CHANGE UP (ref 3.8–10.5)
WBC # FLD AUTO: 6.51 K/UL — SIGNIFICANT CHANGE UP (ref 3.8–10.5)

## 2024-06-20 PROCEDURE — 73560 X-RAY EXAM OF KNEE 1 OR 2: CPT | Mod: 26,RT

## 2024-06-20 PROCEDURE — 99222 1ST HOSP IP/OBS MODERATE 55: CPT

## 2024-06-20 DEVICE — IMPLANTABLE DEVICE: Type: IMPLANTABLE DEVICE | Status: FUNCTIONAL

## 2024-06-20 DEVICE — COMP PATELLA ASYMMETRIC X3 32X10MM: Type: IMPLANTABLE DEVICE | Status: FUNCTIONAL

## 2024-06-20 DEVICE — IMP PATELLA ASYMMETRIC X3 35X10MM: Type: IMPLANTABLE DEVICE | Status: FUNCTIONAL

## 2024-06-20 DEVICE — CEMENT SIMPLEX WITH TOBRAMYCIN: Type: IMPLANTABLE DEVICE | Status: FUNCTIONAL

## 2024-06-20 DEVICE — COMP FEM TRIATHLON PS SZ 2 RT: Type: IMPLANTABLE DEVICE | Status: FUNCTIONAL

## 2024-06-20 DEVICE — BASEPLATE TIB UNIV TRIATHLON SZ 2: Type: IMPLANTABLE DEVICE | Status: FUNCTIONAL

## 2024-06-20 RX ORDER — MONTELUKAST SODIUM 10 MG/1
10 TABLET, FILM COATED ORAL DAILY
Refills: 0 | Status: DISCONTINUED | OUTPATIENT
Start: 2024-06-20 | End: 2024-06-24

## 2024-06-20 RX ORDER — OXYCODONE HYDROCHLORIDE 100 MG/5ML
10 SOLUTION ORAL EVERY 4 HOURS
Refills: 0 | Status: DISCONTINUED | OUTPATIENT
Start: 2024-06-20 | End: 2024-06-24

## 2024-06-20 RX ORDER — ENOXAPARIN SODIUM 100 MG/ML
30 INJECTION SUBCUTANEOUS EVERY 12 HOURS
Refills: 0 | Status: DISCONTINUED | OUTPATIENT
Start: 2024-06-20 | End: 2024-06-24

## 2024-06-20 RX ORDER — SENNOSIDES 8.6 MG
2 TABLET ORAL AT BEDTIME
Refills: 0 | Status: DISCONTINUED | OUTPATIENT
Start: 2024-06-20 | End: 2024-06-24

## 2024-06-20 RX ORDER — FLUTICASONE FUROATE, UMECLIDINIUM BROMIDE AND VILANTEROL TRIFENATATE 200; 62.5; 25 UG/1; UG/1; UG/1
1 POWDER RESPIRATORY (INHALATION)
Refills: 0 | DISCHARGE

## 2024-06-20 RX ORDER — TRAMADOL HYDROCHLORIDE 50 MG/1
50 TABLET, FILM COATED ORAL EVERY 8 HOURS
Refills: 0 | Status: DISCONTINUED | OUTPATIENT
Start: 2024-06-20 | End: 2024-06-24

## 2024-06-20 RX ORDER — POLYETHYLENE GLYCOL 3350 1 G/G
17 POWDER ORAL AT BEDTIME
Refills: 0 | Status: DISCONTINUED | OUTPATIENT
Start: 2024-06-20 | End: 2024-06-24

## 2024-06-20 RX ORDER — OXYCODONE HYDROCHLORIDE 100 MG/5ML
10 SOLUTION ORAL EVERY 6 HOURS
Refills: 0 | Status: DISCONTINUED | OUTPATIENT
Start: 2024-06-20 | End: 2024-06-20

## 2024-06-20 RX ORDER — IPRATROPIUM BROMIDE 0.5 MG/2.5ML
500 SOLUTION RESPIRATORY (INHALATION) EVERY 6 HOURS
Refills: 0 | Status: DISCONTINUED | OUTPATIENT
Start: 2024-06-20 | End: 2024-06-24

## 2024-06-20 RX ORDER — ALBUTEROL 90 UG/1
3 AEROSOL, METERED ORAL
Qty: 0 | Refills: 0 | DISCHARGE

## 2024-06-20 RX ORDER — SODIUM CHLORIDE 0.9 % (FLUSH) 0.9 %
1000 SYRINGE (ML) INJECTION
Refills: 0 | Status: DISCONTINUED | OUTPATIENT
Start: 2024-06-20 | End: 2024-06-23

## 2024-06-20 RX ORDER — DEXTROSE MONOHYDRATE AND SODIUM CHLORIDE 5; .3 G/100ML; G/100ML
1000 INJECTION, SOLUTION INTRAVENOUS
Refills: 0 | Status: DISCONTINUED | OUTPATIENT
Start: 2024-06-20 | End: 2024-06-20

## 2024-06-20 RX ORDER — IPRATROPIUM BROMIDE AND ALBUTEROL SULFATE .5; 3 MG/3ML; MG/3ML
3 SOLUTION RESPIRATORY (INHALATION) EVERY 6 HOURS
Refills: 0 | Status: DISCONTINUED | OUTPATIENT
Start: 2024-06-20 | End: 2024-06-24

## 2024-06-20 RX ORDER — FAMOTIDINE 10 MG/ML
1 INJECTION INTRAVENOUS
Refills: 0 | DISCHARGE

## 2024-06-20 RX ORDER — CEFAZOLIN 10 G/1
2000 INJECTION, POWDER, FOR SOLUTION INTRAVENOUS EVERY 8 HOURS
Refills: 0 | Status: COMPLETED | OUTPATIENT
Start: 2024-06-20 | End: 2024-06-21

## 2024-06-20 RX ORDER — ACETAMINOPHEN 325 MG
1000 TABLET ORAL EVERY 6 HOURS
Refills: 0 | Status: COMPLETED | OUTPATIENT
Start: 2024-06-20 | End: 2024-06-21

## 2024-06-20 RX ORDER — OXYCODONE HYDROCHLORIDE 100 MG/5ML
5 SOLUTION ORAL EVERY 4 HOURS
Refills: 0 | Status: DISCONTINUED | OUTPATIENT
Start: 2024-06-20 | End: 2024-06-24

## 2024-06-20 RX ORDER — BUDESONIDE/FORMOTEROL FUMARATE 160-4.5MCG
2 HFA AEROSOL WITH ADAPTER (GRAM) INHALATION
Refills: 0 | Status: DISCONTINUED | OUTPATIENT
Start: 2024-06-20 | End: 2024-06-24

## 2024-06-20 RX ORDER — OXYCODONE AND ACETAMINOPHEN 5; 325 MG/1; MG/1
1 TABLET ORAL ONCE
Refills: 0 | Status: DISCONTINUED | OUTPATIENT
Start: 2024-06-20 | End: 2024-06-20

## 2024-06-20 RX ORDER — TRAMADOL HYDROCHLORIDE 50 MG/1
50 TABLET, FILM COATED ORAL ONCE
Refills: 0 | Status: DISCONTINUED | OUTPATIENT
Start: 2024-06-20 | End: 2024-06-20

## 2024-06-20 RX ORDER — ONDANSETRON HYDROCHLORIDE 2 MG/ML
4 INJECTION INTRAMUSCULAR; INTRAVENOUS ONCE
Refills: 0 | Status: DISCONTINUED | OUTPATIENT
Start: 2024-06-20 | End: 2024-06-20

## 2024-06-20 RX ORDER — PANTOPRAZOLE SODIUM 40 MG/10ML
40 INJECTION, POWDER, FOR SOLUTION INTRAVENOUS
Refills: 0 | Status: DISCONTINUED | OUTPATIENT
Start: 2024-06-20 | End: 2024-06-24

## 2024-06-20 RX ORDER — FERROUS SULFATE 325(65) MG
325 TABLET ORAL DAILY
Refills: 0 | Status: DISCONTINUED | OUTPATIENT
Start: 2024-06-20 | End: 2024-06-24

## 2024-06-20 RX ORDER — ALBUTEROL 90 UG/1
0 AEROSOL, METERED ORAL
Refills: 0 | DISCHARGE

## 2024-06-20 RX ORDER — ONDANSETRON HYDROCHLORIDE 2 MG/ML
4 INJECTION INTRAMUSCULAR; INTRAVENOUS EVERY 6 HOURS
Refills: 0 | Status: DISCONTINUED | OUTPATIENT
Start: 2024-06-20 | End: 2024-06-24

## 2024-06-20 RX ORDER — HYDROMORPHONE HCL 0.2 MG/ML
0.25 INJECTION, SOLUTION INTRAVENOUS
Refills: 0 | Status: DISCONTINUED | OUTPATIENT
Start: 2024-06-20 | End: 2024-06-20

## 2024-06-20 RX ADMIN — Medication 1 APPLICATION(S): at 11:11

## 2024-06-20 RX ADMIN — Medication 3 MILLILITER(S): at 23:10

## 2024-06-20 RX ADMIN — Medication 1000 MILLIGRAM(S): at 17:35

## 2024-06-20 RX ADMIN — DEXTROSE MONOHYDRATE AND SODIUM CHLORIDE 75 MILLILITER(S): 5; .3 INJECTION, SOLUTION INTRAVENOUS at 15:53

## 2024-06-20 RX ADMIN — Medication 105 MILLILITER(S): at 17:02

## 2024-06-20 RX ADMIN — Medication 2 TABLET(S): at 21:34

## 2024-06-20 RX ADMIN — Medication 1000 MILLIGRAM(S): at 17:03

## 2024-06-20 RX ADMIN — HYDROMORPHONE HCL 0.25 MILLIGRAM(S): 0.2 INJECTION, SOLUTION INTRAVENOUS at 15:45

## 2024-06-20 RX ADMIN — TRAMADOL HYDROCHLORIDE 50 MILLIGRAM(S): 50 TABLET, FILM COATED ORAL at 21:34

## 2024-06-20 RX ADMIN — TRAMADOL HYDROCHLORIDE 50 MILLIGRAM(S): 50 TABLET, FILM COATED ORAL at 11:11

## 2024-06-20 RX ADMIN — Medication 3 MILLILITER(S): at 11:05

## 2024-06-20 RX ADMIN — TRAMADOL HYDROCHLORIDE 50 MILLIGRAM(S): 50 TABLET, FILM COATED ORAL at 23:10

## 2024-06-20 RX ADMIN — POLYETHYLENE GLYCOL 3350 17 GRAM(S): 1 POWDER ORAL at 21:34

## 2024-06-20 RX ADMIN — Medication 1000 MILLIGRAM(S): at 23:44

## 2024-06-20 RX ADMIN — OXYCODONE HYDROCHLORIDE 10 MILLIGRAM(S): 100 SOLUTION ORAL at 23:46

## 2024-06-20 RX ADMIN — Medication 2 PUFF(S): at 21:35

## 2024-06-20 RX ADMIN — CEFAZOLIN 100 MILLIGRAM(S): 10 INJECTION, POWDER, FOR SOLUTION INTRAVENOUS at 22:13

## 2024-06-20 RX ADMIN — IPRATROPIUM BROMIDE 500 MICROGRAM(S): 0.5 SOLUTION RESPIRATORY (INHALATION) at 20:02

## 2024-06-20 RX ADMIN — HYDROMORPHONE HCL 0.25 MILLIGRAM(S): 0.2 INJECTION, SOLUTION INTRAVENOUS at 15:30

## 2024-06-20 RX ADMIN — ENOXAPARIN SODIUM 30 MILLIGRAM(S): 100 INJECTION SUBCUTANEOUS at 21:36

## 2024-06-21 LAB
ANION GAP SERPL CALC-SCNC: 6 MMOL/L — SIGNIFICANT CHANGE UP (ref 5–17)
BUN SERPL-MCNC: 10 MG/DL — SIGNIFICANT CHANGE UP (ref 7–18)
CALCIUM SERPL-MCNC: 7.7 MG/DL — LOW (ref 8.4–10.5)
CHLORIDE SERPL-SCNC: 107 MMOL/L — SIGNIFICANT CHANGE UP (ref 96–108)
CO2 SERPL-SCNC: 22 MMOL/L — SIGNIFICANT CHANGE UP (ref 22–31)
CREAT SERPL-MCNC: 0.73 MG/DL — SIGNIFICANT CHANGE UP (ref 0.5–1.3)
EGFR: 88 ML/MIN/1.73M2 — SIGNIFICANT CHANGE UP
GLUCOSE SERPL-MCNC: 155 MG/DL — HIGH (ref 70–99)
HCT VFR BLD CALC: 31.9 % — LOW (ref 34.5–45)
HGB BLD-MCNC: 9.9 G/DL — LOW (ref 11.5–15.5)
MCHC RBC-ENTMCNC: 26 PG — LOW (ref 27–34)
MCHC RBC-ENTMCNC: 31 GM/DL — LOW (ref 32–36)
MCV RBC AUTO: 83.7 FL — SIGNIFICANT CHANGE UP (ref 80–100)
NRBC # BLD: 0 /100 WBCS — SIGNIFICANT CHANGE UP (ref 0–0)
PLATELET # BLD AUTO: 256 K/UL — SIGNIFICANT CHANGE UP (ref 150–400)
POTASSIUM SERPL-MCNC: 3.9 MMOL/L — SIGNIFICANT CHANGE UP (ref 3.5–5.3)
POTASSIUM SERPL-SCNC: 3.9 MMOL/L — SIGNIFICANT CHANGE UP (ref 3.5–5.3)
RBC # BLD: 3.81 M/UL — SIGNIFICANT CHANGE UP (ref 3.8–5.2)
RBC # FLD: 22.8 % — HIGH (ref 10.3–14.5)
SODIUM SERPL-SCNC: 135 MMOL/L — SIGNIFICANT CHANGE UP (ref 135–145)
WBC # BLD: 8.87 K/UL — SIGNIFICANT CHANGE UP (ref 3.8–10.5)
WBC # FLD AUTO: 8.87 K/UL — SIGNIFICANT CHANGE UP (ref 3.8–10.5)

## 2024-06-21 PROCEDURE — 99232 SBSQ HOSP IP/OBS MODERATE 35: CPT

## 2024-06-21 RX ORDER — ENOXAPARIN SODIUM 100 MG/ML
1 INJECTION SUBCUTANEOUS
Qty: 0 | Refills: 0 | DISCHARGE
Start: 2024-06-21

## 2024-06-21 RX ORDER — SENNOSIDES 8.6 MG
2 TABLET ORAL
Qty: 0 | Refills: 0 | DISCHARGE
Start: 2024-06-21

## 2024-06-21 RX ORDER — OXYCODONE HYDROCHLORIDE 100 MG/5ML
1 SOLUTION ORAL
Qty: 0 | Refills: 0 | DISCHARGE
Start: 2024-06-21

## 2024-06-21 RX ORDER — MONTELUKAST 4 MG/1
1 TABLET, CHEWABLE ORAL
Qty: 0 | Refills: 0 | DISCHARGE

## 2024-06-21 RX ORDER — ONDANSETRON HYDROCHLORIDE 2 MG/ML
1 INJECTION INTRAMUSCULAR; INTRAVENOUS
Qty: 0 | Refills: 0 | DISCHARGE
Start: 2024-06-21

## 2024-06-21 RX ORDER — ACETAMINOPHEN 325 MG
2 TABLET ORAL
Qty: 0 | Refills: 0 | DISCHARGE
Start: 2024-06-21

## 2024-06-21 RX ORDER — ACETAMINOPHEN 325 MG
650 TABLET ORAL EVERY 6 HOURS
Refills: 0 | Status: DISCONTINUED | OUTPATIENT
Start: 2024-06-21 | End: 2024-06-24

## 2024-06-21 RX ADMIN — Medication 1000 MILLIGRAM(S): at 05:51

## 2024-06-21 RX ADMIN — Medication 1000 MILLIGRAM(S): at 05:05

## 2024-06-21 RX ADMIN — Medication 3 MILLILITER(S): at 13:50

## 2024-06-21 RX ADMIN — OXYCODONE HYDROCHLORIDE 10 MILLIGRAM(S): 100 SOLUTION ORAL at 04:21

## 2024-06-21 RX ADMIN — Medication 2 PUFF(S): at 10:05

## 2024-06-21 RX ADMIN — Medication 3 MILLILITER(S): at 05:02

## 2024-06-21 RX ADMIN — OXYCODONE HYDROCHLORIDE 10 MILLIGRAM(S): 100 SOLUTION ORAL at 04:17

## 2024-06-21 RX ADMIN — ONDANSETRON HYDROCHLORIDE 4 MILLIGRAM(S): 2 INJECTION INTRAMUSCULAR; INTRAVENOUS at 13:09

## 2024-06-21 RX ADMIN — CEFAZOLIN 100 MILLIGRAM(S): 10 INJECTION, POWDER, FOR SOLUTION INTRAVENOUS at 04:36

## 2024-06-21 RX ADMIN — Medication 1000 MILLIGRAM(S): at 13:45

## 2024-06-21 RX ADMIN — Medication 1000 MILLIGRAM(S): at 13:15

## 2024-06-21 RX ADMIN — PANTOPRAZOLE SODIUM 40 MILLIGRAM(S): 40 INJECTION, POWDER, FOR SOLUTION INTRAVENOUS at 05:05

## 2024-06-21 RX ADMIN — TRAMADOL HYDROCHLORIDE 50 MILLIGRAM(S): 50 TABLET, FILM COATED ORAL at 05:05

## 2024-06-21 RX ADMIN — OXYCODONE HYDROCHLORIDE 10 MILLIGRAM(S): 100 SOLUTION ORAL at 08:47

## 2024-06-21 RX ADMIN — IPRATROPIUM BROMIDE 500 MICROGRAM(S): 0.5 SOLUTION RESPIRATORY (INHALATION) at 21:11

## 2024-06-21 RX ADMIN — Medication 325 MILLIGRAM(S): at 13:14

## 2024-06-21 RX ADMIN — TRAMADOL HYDROCHLORIDE 50 MILLIGRAM(S): 50 TABLET, FILM COATED ORAL at 22:38

## 2024-06-21 RX ADMIN — IPRATROPIUM BROMIDE 500 MICROGRAM(S): 0.5 SOLUTION RESPIRATORY (INHALATION) at 02:03

## 2024-06-21 RX ADMIN — MONTELUKAST SODIUM 10 MILLIGRAM(S): 10 TABLET, FILM COATED ORAL at 13:15

## 2024-06-21 RX ADMIN — ENOXAPARIN SODIUM 30 MILLIGRAM(S): 100 INJECTION SUBCUTANEOUS at 10:09

## 2024-06-21 RX ADMIN — ENOXAPARIN SODIUM 30 MILLIGRAM(S): 100 INJECTION SUBCUTANEOUS at 22:38

## 2024-06-21 RX ADMIN — ONDANSETRON HYDROCHLORIDE 4 MILLIGRAM(S): 2 INJECTION INTRAMUSCULAR; INTRAVENOUS at 07:01

## 2024-06-21 RX ADMIN — POLYETHYLENE GLYCOL 3350 17 GRAM(S): 1 POWDER ORAL at 22:38

## 2024-06-21 RX ADMIN — Medication 650 MILLIGRAM(S): at 20:24

## 2024-06-21 RX ADMIN — Medication 2 PUFF(S): at 22:38

## 2024-06-21 RX ADMIN — Medication 2 TABLET(S): at 22:38

## 2024-06-21 RX ADMIN — OXYCODONE HYDROCHLORIDE 10 MILLIGRAM(S): 100 SOLUTION ORAL at 09:47

## 2024-06-21 RX ADMIN — TRAMADOL HYDROCHLORIDE 50 MILLIGRAM(S): 50 TABLET, FILM COATED ORAL at 05:51

## 2024-06-21 RX ADMIN — Medication 3 MILLILITER(S): at 22:39

## 2024-06-21 RX ADMIN — ONDANSETRON HYDROCHLORIDE 4 MILLIGRAM(S): 2 INJECTION INTRAMUSCULAR; INTRAVENOUS at 22:38

## 2024-06-21 RX ADMIN — Medication 30 MILLILITER(S): at 10:04

## 2024-06-22 LAB
ANION GAP SERPL CALC-SCNC: 8 MMOL/L — SIGNIFICANT CHANGE UP (ref 5–17)
BUN SERPL-MCNC: 5 MG/DL — LOW (ref 7–18)
CALCIUM SERPL-MCNC: 8.1 MG/DL — LOW (ref 8.4–10.5)
CHLORIDE SERPL-SCNC: 104 MMOL/L — SIGNIFICANT CHANGE UP (ref 96–108)
CO2 SERPL-SCNC: 23 MMOL/L — SIGNIFICANT CHANGE UP (ref 22–31)
CREAT SERPL-MCNC: 0.45 MG/DL — LOW (ref 0.5–1.3)
EGFR: 103 ML/MIN/1.73M2 — SIGNIFICANT CHANGE UP
GLUCOSE SERPL-MCNC: 126 MG/DL — HIGH (ref 70–99)
HCT VFR BLD CALC: 32.2 % — LOW (ref 34.5–45)
HGB BLD-MCNC: 10.2 G/DL — LOW (ref 11.5–15.5)
MCHC RBC-ENTMCNC: 26.2 PG — LOW (ref 27–34)
MCHC RBC-ENTMCNC: 31.7 GM/DL — LOW (ref 32–36)
MCV RBC AUTO: 82.6 FL — SIGNIFICANT CHANGE UP (ref 80–100)
NRBC # BLD: 0 /100 WBCS — SIGNIFICANT CHANGE UP (ref 0–0)
PLATELET # BLD AUTO: 269 K/UL — SIGNIFICANT CHANGE UP (ref 150–400)
POTASSIUM SERPL-MCNC: 3.7 MMOL/L — SIGNIFICANT CHANGE UP (ref 3.5–5.3)
POTASSIUM SERPL-SCNC: 3.7 MMOL/L — SIGNIFICANT CHANGE UP (ref 3.5–5.3)
RBC # BLD: 3.9 M/UL — SIGNIFICANT CHANGE UP (ref 3.8–5.2)
RBC # FLD: 22.4 % — HIGH (ref 10.3–14.5)
SODIUM SERPL-SCNC: 135 MMOL/L — SIGNIFICANT CHANGE UP (ref 135–145)
WBC # BLD: 11.73 K/UL — HIGH (ref 3.8–10.5)
WBC # FLD AUTO: 11.73 K/UL — HIGH (ref 3.8–10.5)

## 2024-06-22 RX ADMIN — OXYCODONE HYDROCHLORIDE 10 MILLIGRAM(S): 100 SOLUTION ORAL at 07:36

## 2024-06-22 RX ADMIN — Medication 2 PUFF(S): at 21:01

## 2024-06-22 RX ADMIN — Medication 650 MILLIGRAM(S): at 07:36

## 2024-06-22 RX ADMIN — Medication 1000 MILLIGRAM(S): at 07:36

## 2024-06-22 RX ADMIN — ENOXAPARIN SODIUM 30 MILLIGRAM(S): 100 INJECTION SUBCUTANEOUS at 09:05

## 2024-06-22 RX ADMIN — TRAMADOL HYDROCHLORIDE 50 MILLIGRAM(S): 50 TABLET, FILM COATED ORAL at 13:57

## 2024-06-22 RX ADMIN — IPRATROPIUM BROMIDE 500 MICROGRAM(S): 0.5 SOLUTION RESPIRATORY (INHALATION) at 08:58

## 2024-06-22 RX ADMIN — PANTOPRAZOLE SODIUM 40 MILLIGRAM(S): 40 INJECTION, POWDER, FOR SOLUTION INTRAVENOUS at 07:42

## 2024-06-22 RX ADMIN — ENOXAPARIN SODIUM 30 MILLIGRAM(S): 100 INJECTION SUBCUTANEOUS at 21:00

## 2024-06-22 RX ADMIN — Medication 325 MILLIGRAM(S): at 11:18

## 2024-06-22 RX ADMIN — Medication 3 MILLILITER(S): at 07:36

## 2024-06-22 RX ADMIN — IPRATROPIUM BROMIDE 500 MICROGRAM(S): 0.5 SOLUTION RESPIRATORY (INHALATION) at 15:59

## 2024-06-22 RX ADMIN — Medication 2 PUFF(S): at 09:05

## 2024-06-22 RX ADMIN — Medication 650 MILLIGRAM(S): at 21:02

## 2024-06-22 RX ADMIN — IPRATROPIUM BROMIDE 500 MICROGRAM(S): 0.5 SOLUTION RESPIRATORY (INHALATION) at 20:37

## 2024-06-22 RX ADMIN — Medication 3 MILLILITER(S): at 13:12

## 2024-06-22 RX ADMIN — TRAMADOL HYDROCHLORIDE 50 MILLIGRAM(S): 50 TABLET, FILM COATED ORAL at 07:36

## 2024-06-22 RX ADMIN — Medication 650 MILLIGRAM(S): at 21:51

## 2024-06-22 RX ADMIN — TRAMADOL HYDROCHLORIDE 50 MILLIGRAM(S): 50 TABLET, FILM COATED ORAL at 13:14

## 2024-06-22 RX ADMIN — Medication 3 MILLILITER(S): at 21:00

## 2024-06-22 RX ADMIN — MONTELUKAST SODIUM 10 MILLIGRAM(S): 10 TABLET, FILM COATED ORAL at 11:18

## 2024-06-22 RX ADMIN — TRAMADOL HYDROCHLORIDE 50 MILLIGRAM(S): 50 TABLET, FILM COATED ORAL at 05:49

## 2024-06-22 RX ADMIN — Medication 2 TABLET(S): at 21:00

## 2024-06-22 RX ADMIN — POLYETHYLENE GLYCOL 3350 17 GRAM(S): 1 POWDER ORAL at 21:00

## 2024-06-23 LAB
ANION GAP SERPL CALC-SCNC: 7 MMOL/L — SIGNIFICANT CHANGE UP (ref 5–17)
BUN SERPL-MCNC: 7 MG/DL — SIGNIFICANT CHANGE UP (ref 7–18)
CALCIUM SERPL-MCNC: 8.1 MG/DL — LOW (ref 8.4–10.5)
CHLORIDE SERPL-SCNC: 103 MMOL/L — SIGNIFICANT CHANGE UP (ref 96–108)
CO2 SERPL-SCNC: 27 MMOL/L — SIGNIFICANT CHANGE UP (ref 22–31)
CREAT SERPL-MCNC: 0.51 MG/DL — SIGNIFICANT CHANGE UP (ref 0.5–1.3)
EGFR: 100 ML/MIN/1.73M2 — SIGNIFICANT CHANGE UP
GLUCOSE SERPL-MCNC: 120 MG/DL — HIGH (ref 70–99)
HCT VFR BLD CALC: 30.3 % — LOW (ref 34.5–45)
HGB BLD-MCNC: 9.6 G/DL — LOW (ref 11.5–15.5)
MCHC RBC-ENTMCNC: 25.9 PG — LOW (ref 27–34)
MCHC RBC-ENTMCNC: 31.7 GM/DL — LOW (ref 32–36)
MCV RBC AUTO: 81.7 FL — SIGNIFICANT CHANGE UP (ref 80–100)
NRBC # BLD: 0 /100 WBCS — SIGNIFICANT CHANGE UP (ref 0–0)
PLATELET # BLD AUTO: 264 K/UL — SIGNIFICANT CHANGE UP (ref 150–400)
POTASSIUM SERPL-MCNC: 3.4 MMOL/L — LOW (ref 3.5–5.3)
POTASSIUM SERPL-SCNC: 3.4 MMOL/L — LOW (ref 3.5–5.3)
RBC # BLD: 3.71 M/UL — LOW (ref 3.8–5.2)
RBC # FLD: 22.5 % — HIGH (ref 10.3–14.5)
SODIUM SERPL-SCNC: 137 MMOL/L — SIGNIFICANT CHANGE UP (ref 135–145)
WBC # BLD: 8.7 K/UL — SIGNIFICANT CHANGE UP (ref 3.8–10.5)
WBC # FLD AUTO: 8.7 K/UL — SIGNIFICANT CHANGE UP (ref 3.8–10.5)

## 2024-06-23 RX ORDER — MAGNESIUM, ALUMINUM HYDROXIDE 400-400
30 TABLET,CHEWABLE ORAL EVERY 4 HOURS
Refills: 0 | Status: DISCONTINUED | OUTPATIENT
Start: 2024-06-23 | End: 2024-06-24

## 2024-06-23 RX ORDER — POTASSIUM CHLORIDE 600 MG/1
20 TABLET, FILM COATED, EXTENDED RELEASE ORAL
Refills: 0 | Status: COMPLETED | OUTPATIENT
Start: 2024-06-23 | End: 2024-06-23

## 2024-06-23 RX ORDER — SODIUM CHLORIDE 0.9 % (FLUSH) 0.9 %
1000 SYRINGE (ML) INJECTION
Refills: 0 | Status: DISCONTINUED | OUTPATIENT
Start: 2024-06-23 | End: 2024-06-24

## 2024-06-23 RX ADMIN — Medication 2 PUFF(S): at 09:17

## 2024-06-23 RX ADMIN — POTASSIUM CHLORIDE 20 MILLIEQUIVALENT(S): 600 TABLET, FILM COATED, EXTENDED RELEASE ORAL at 13:50

## 2024-06-23 RX ADMIN — IPRATROPIUM BROMIDE 500 MICROGRAM(S): 0.5 SOLUTION RESPIRATORY (INHALATION) at 15:28

## 2024-06-23 RX ADMIN — OXYCODONE HYDROCHLORIDE 10 MILLIGRAM(S): 100 SOLUTION ORAL at 12:00

## 2024-06-23 RX ADMIN — Medication 3 MILLILITER(S): at 21:38

## 2024-06-23 RX ADMIN — POTASSIUM CHLORIDE 20 MILLIEQUIVALENT(S): 600 TABLET, FILM COATED, EXTENDED RELEASE ORAL at 11:55

## 2024-06-23 RX ADMIN — MONTELUKAST SODIUM 10 MILLIGRAM(S): 10 TABLET, FILM COATED ORAL at 11:55

## 2024-06-23 RX ADMIN — POLYETHYLENE GLYCOL 3350 17 GRAM(S): 1 POWDER ORAL at 21:32

## 2024-06-23 RX ADMIN — Medication 30 MILLILITER(S): at 21:28

## 2024-06-23 RX ADMIN — Medication 325 MILLIGRAM(S): at 11:55

## 2024-06-23 RX ADMIN — PANTOPRAZOLE SODIUM 40 MILLIGRAM(S): 40 INJECTION, POWDER, FOR SOLUTION INTRAVENOUS at 05:10

## 2024-06-23 RX ADMIN — ENOXAPARIN SODIUM 30 MILLIGRAM(S): 100 INJECTION SUBCUTANEOUS at 09:17

## 2024-06-23 RX ADMIN — TRAMADOL HYDROCHLORIDE 50 MILLIGRAM(S): 50 TABLET, FILM COATED ORAL at 05:10

## 2024-06-23 RX ADMIN — ENOXAPARIN SODIUM 30 MILLIGRAM(S): 100 INJECTION SUBCUTANEOUS at 21:33

## 2024-06-23 RX ADMIN — Medication 250 MILLILITER(S): at 12:09

## 2024-06-23 RX ADMIN — Medication 3 MILLILITER(S): at 05:07

## 2024-06-23 RX ADMIN — Medication 2 PUFF(S): at 21:28

## 2024-06-23 RX ADMIN — Medication 3 MILLILITER(S): at 13:34

## 2024-06-23 RX ADMIN — ONDANSETRON HYDROCHLORIDE 4 MILLIGRAM(S): 2 INJECTION INTRAMUSCULAR; INTRAVENOUS at 22:37

## 2024-06-23 RX ADMIN — OXYCODONE HYDROCHLORIDE 10 MILLIGRAM(S): 100 SOLUTION ORAL at 16:08

## 2024-06-23 RX ADMIN — Medication 2 TABLET(S): at 21:33

## 2024-06-23 RX ADMIN — TRAMADOL HYDROCHLORIDE 50 MILLIGRAM(S): 50 TABLET, FILM COATED ORAL at 06:00

## 2024-06-23 RX ADMIN — ONDANSETRON HYDROCHLORIDE 4 MILLIGRAM(S): 2 INJECTION INTRAMUSCULAR; INTRAVENOUS at 08:46

## 2024-06-23 RX ADMIN — OXYCODONE HYDROCHLORIDE 10 MILLIGRAM(S): 100 SOLUTION ORAL at 13:32

## 2024-06-24 VITALS
SYSTOLIC BLOOD PRESSURE: 156 MMHG | DIASTOLIC BLOOD PRESSURE: 77 MMHG | HEART RATE: 98 BPM | OXYGEN SATURATION: 98 % | RESPIRATION RATE: 22 BRPM

## 2024-06-24 LAB
ANION GAP SERPL CALC-SCNC: 5 MMOL/L — SIGNIFICANT CHANGE UP (ref 5–17)
BUN SERPL-MCNC: 8 MG/DL — SIGNIFICANT CHANGE UP (ref 7–18)
CALCIUM SERPL-MCNC: 8.3 MG/DL — LOW (ref 8.4–10.5)
CHLORIDE SERPL-SCNC: 104 MMOL/L — SIGNIFICANT CHANGE UP (ref 96–108)
CO2 SERPL-SCNC: 27 MMOL/L — SIGNIFICANT CHANGE UP (ref 22–31)
CREAT SERPL-MCNC: 0.51 MG/DL — SIGNIFICANT CHANGE UP (ref 0.5–1.3)
EGFR: 100 ML/MIN/1.73M2 — SIGNIFICANT CHANGE UP
GLUCOSE SERPL-MCNC: 130 MG/DL — HIGH (ref 70–99)
HCT VFR BLD CALC: 29.4 % — LOW (ref 34.5–45)
HGB BLD-MCNC: 9.3 G/DL — LOW (ref 11.5–15.5)
MCHC RBC-ENTMCNC: 26.2 PG — LOW (ref 27–34)
MCHC RBC-ENTMCNC: 31.6 GM/DL — LOW (ref 32–36)
MCV RBC AUTO: 82.8 FL — SIGNIFICANT CHANGE UP (ref 80–100)
NRBC # BLD: 0 /100 WBCS — SIGNIFICANT CHANGE UP (ref 0–0)
PLATELET # BLD AUTO: 267 K/UL — SIGNIFICANT CHANGE UP (ref 150–400)
POTASSIUM SERPL-MCNC: 4.1 MMOL/L — SIGNIFICANT CHANGE UP (ref 3.5–5.3)
POTASSIUM SERPL-SCNC: 4.1 MMOL/L — SIGNIFICANT CHANGE UP (ref 3.5–5.3)
RBC # BLD: 3.55 M/UL — LOW (ref 3.8–5.2)
RBC # FLD: 22.3 % — HIGH (ref 10.3–14.5)
SARS-COV-2 RNA SPEC QL NAA+PROBE: SIGNIFICANT CHANGE UP
SODIUM SERPL-SCNC: 136 MMOL/L — SIGNIFICANT CHANGE UP (ref 135–145)
WBC # BLD: 6.57 K/UL — SIGNIFICANT CHANGE UP (ref 3.8–10.5)
WBC # FLD AUTO: 6.57 K/UL — SIGNIFICANT CHANGE UP (ref 3.8–10.5)

## 2024-06-24 PROCEDURE — 97162 PT EVAL MOD COMPLEX 30 MIN: CPT

## 2024-06-24 PROCEDURE — 97530 THERAPEUTIC ACTIVITIES: CPT

## 2024-06-24 PROCEDURE — 86900 BLOOD TYPING SEROLOGIC ABO: CPT

## 2024-06-24 PROCEDURE — 87635 SARS-COV-2 COVID-19 AMP PRB: CPT

## 2024-06-24 PROCEDURE — 97116 GAIT TRAINING THERAPY: CPT

## 2024-06-24 PROCEDURE — 99232 SBSQ HOSP IP/OBS MODERATE 35: CPT

## 2024-06-24 PROCEDURE — 80048 BASIC METABOLIC PNL TOTAL CA: CPT

## 2024-06-24 PROCEDURE — 94640 AIRWAY INHALATION TREATMENT: CPT

## 2024-06-24 PROCEDURE — 86901 BLOOD TYPING SEROLOGIC RH(D): CPT

## 2024-06-24 PROCEDURE — C1713: CPT

## 2024-06-24 PROCEDURE — C1776: CPT

## 2024-06-24 PROCEDURE — 97110 THERAPEUTIC EXERCISES: CPT

## 2024-06-24 PROCEDURE — 85027 COMPLETE CBC AUTOMATED: CPT

## 2024-06-24 PROCEDURE — 36415 COLL VENOUS BLD VENIPUNCTURE: CPT

## 2024-06-24 PROCEDURE — 86850 RBC ANTIBODY SCREEN: CPT

## 2024-06-24 PROCEDURE — 73560 X-RAY EXAM OF KNEE 1 OR 2: CPT

## 2024-06-24 PROCEDURE — 82962 GLUCOSE BLOOD TEST: CPT

## 2024-06-24 RX ADMIN — ONDANSETRON HYDROCHLORIDE 4 MILLIGRAM(S): 2 INJECTION INTRAMUSCULAR; INTRAVENOUS at 09:23

## 2024-06-24 RX ADMIN — OXYCODONE HYDROCHLORIDE 10 MILLIGRAM(S): 100 SOLUTION ORAL at 05:10

## 2024-06-24 RX ADMIN — IPRATROPIUM BROMIDE 500 MICROGRAM(S): 0.5 SOLUTION RESPIRATORY (INHALATION) at 14:26

## 2024-06-24 RX ADMIN — Medication 3 MILLILITER(S): at 13:18

## 2024-06-24 RX ADMIN — MONTELUKAST SODIUM 10 MILLIGRAM(S): 10 TABLET, FILM COATED ORAL at 12:10

## 2024-06-24 RX ADMIN — TRAMADOL HYDROCHLORIDE 50 MILLIGRAM(S): 50 TABLET, FILM COATED ORAL at 13:24

## 2024-06-24 RX ADMIN — PANTOPRAZOLE SODIUM 40 MILLIGRAM(S): 40 INJECTION, POWDER, FOR SOLUTION INTRAVENOUS at 06:16

## 2024-06-24 RX ADMIN — Medication 2 PUFF(S): at 12:11

## 2024-06-24 RX ADMIN — TRAMADOL HYDROCHLORIDE 50 MILLIGRAM(S): 50 TABLET, FILM COATED ORAL at 13:45

## 2024-06-24 RX ADMIN — Medication 325 MILLIGRAM(S): at 12:10

## 2024-06-24 RX ADMIN — Medication 3 MILLILITER(S): at 05:06

## 2024-06-24 RX ADMIN — ENOXAPARIN SODIUM 30 MILLIGRAM(S): 100 INJECTION SUBCUTANEOUS at 09:23

## 2025-07-01 ENCOUNTER — EMERGENCY (EMERGENCY)
Facility: HOSPITAL | Age: 75
LOS: 1 days | End: 2025-07-01
Attending: STUDENT IN AN ORGANIZED HEALTH CARE EDUCATION/TRAINING PROGRAM
Payer: COMMERCIAL

## 2025-07-01 VITALS
HEART RATE: 90 BPM | HEIGHT: 61 IN | DIASTOLIC BLOOD PRESSURE: 85 MMHG | TEMPERATURE: 98 F | SYSTOLIC BLOOD PRESSURE: 155 MMHG | OXYGEN SATURATION: 98 % | RESPIRATION RATE: 18 BRPM | WEIGHT: 154.32 LBS

## 2025-07-01 VITALS
TEMPERATURE: 98 F | OXYGEN SATURATION: 97 % | HEART RATE: 74 BPM | SYSTOLIC BLOOD PRESSURE: 157 MMHG | DIASTOLIC BLOOD PRESSURE: 80 MMHG | RESPIRATION RATE: 18 BRPM

## 2025-07-01 DIAGNOSIS — Z87.81 PERSONAL HISTORY OF (HEALED) TRAUMATIC FRACTURE: Chronic | ICD-10-CM

## 2025-07-01 DIAGNOSIS — Z96.652 PRESENCE OF LEFT ARTIFICIAL KNEE JOINT: Chronic | ICD-10-CM

## 2025-07-01 PROBLEM — M17.11 UNILATERAL PRIMARY OSTEOARTHRITIS, RIGHT KNEE: Chronic | Status: ACTIVE | Noted: 2024-06-05

## 2025-07-01 LAB
ALBUMIN SERPL ELPH-MCNC: 3 G/DL — LOW (ref 3.5–5)
ALP SERPL-CCNC: 75 U/L — SIGNIFICANT CHANGE UP (ref 40–120)
ALT FLD-CCNC: 22 U/L DA — SIGNIFICANT CHANGE UP (ref 10–60)
ANION GAP SERPL CALC-SCNC: 2 MMOL/L — LOW (ref 5–17)
AST SERPL-CCNC: 32 U/L — SIGNIFICANT CHANGE UP (ref 10–40)
BASOPHILS # BLD AUTO: 0.03 K/UL — SIGNIFICANT CHANGE UP (ref 0–0.2)
BASOPHILS NFR BLD AUTO: 0.5 % — SIGNIFICANT CHANGE UP (ref 0–2)
BILIRUB SERPL-MCNC: 0.5 MG/DL — SIGNIFICANT CHANGE UP (ref 0.2–1.2)
BUN SERPL-MCNC: 17 MG/DL — SIGNIFICANT CHANGE UP (ref 7–18)
CALCIUM SERPL-MCNC: 8.5 MG/DL — SIGNIFICANT CHANGE UP (ref 8.4–10.5)
CHLORIDE SERPL-SCNC: 109 MMOL/L — HIGH (ref 96–108)
CO2 SERPL-SCNC: 29 MMOL/L — SIGNIFICANT CHANGE UP (ref 22–31)
CREAT SERPL-MCNC: 0.62 MG/DL — SIGNIFICANT CHANGE UP (ref 0.5–1.3)
EGFR: 95 ML/MIN/1.73M2 — SIGNIFICANT CHANGE UP
EGFR: 95 ML/MIN/1.73M2 — SIGNIFICANT CHANGE UP
EOSINOPHIL # BLD AUTO: 0.09 K/UL — SIGNIFICANT CHANGE UP (ref 0–0.5)
EOSINOPHIL NFR BLD AUTO: 1.4 % — SIGNIFICANT CHANGE UP (ref 0–6)
GLUCOSE SERPL-MCNC: 86 MG/DL — SIGNIFICANT CHANGE UP (ref 70–99)
HCT VFR BLD CALC: 40.1 % — SIGNIFICANT CHANGE UP (ref 34.5–45)
HGB BLD-MCNC: 13.3 G/DL — SIGNIFICANT CHANGE UP (ref 11.5–15.5)
IMM GRANULOCYTES NFR BLD AUTO: 0.3 % — SIGNIFICANT CHANGE UP (ref 0–0.9)
LYMPHOCYTES # BLD AUTO: 1.15 K/UL — SIGNIFICANT CHANGE UP (ref 1–3.3)
LYMPHOCYTES # BLD AUTO: 17.6 % — SIGNIFICANT CHANGE UP (ref 13–44)
MAGNESIUM SERPL-MCNC: 2 MG/DL — SIGNIFICANT CHANGE UP (ref 1.6–2.6)
MCHC RBC-ENTMCNC: 29.3 PG — SIGNIFICANT CHANGE UP (ref 27–34)
MCHC RBC-ENTMCNC: 33.2 G/DL — SIGNIFICANT CHANGE UP (ref 32–36)
MCV RBC AUTO: 88.3 FL — SIGNIFICANT CHANGE UP (ref 80–100)
MONOCYTES # BLD AUTO: 0.43 K/UL — SIGNIFICANT CHANGE UP (ref 0–0.9)
MONOCYTES NFR BLD AUTO: 6.6 % — SIGNIFICANT CHANGE UP (ref 2–14)
NEUTROPHILS # BLD AUTO: 4.8 K/UL — SIGNIFICANT CHANGE UP (ref 1.8–7.4)
NEUTROPHILS NFR BLD AUTO: 73.6 % — SIGNIFICANT CHANGE UP (ref 43–77)
NRBC BLD AUTO-RTO: 0 /100 WBCS — SIGNIFICANT CHANGE UP (ref 0–0)
PLATELET # BLD AUTO: 228 K/UL — SIGNIFICANT CHANGE UP (ref 150–400)
POTASSIUM SERPL-MCNC: 4.6 MMOL/L — SIGNIFICANT CHANGE UP (ref 3.5–5.3)
POTASSIUM SERPL-SCNC: 4.6 MMOL/L — SIGNIFICANT CHANGE UP (ref 3.5–5.3)
PROT SERPL-MCNC: 6.2 G/DL — SIGNIFICANT CHANGE UP (ref 6–8.3)
RBC # BLD: 4.54 M/UL — SIGNIFICANT CHANGE UP (ref 3.8–5.2)
RBC # FLD: 14 % — SIGNIFICANT CHANGE UP (ref 10.3–14.5)
SODIUM SERPL-SCNC: 140 MMOL/L — SIGNIFICANT CHANGE UP (ref 135–145)
WBC # BLD: 6.52 K/UL — SIGNIFICANT CHANGE UP (ref 3.8–10.5)
WBC # FLD AUTO: 6.52 K/UL — SIGNIFICANT CHANGE UP (ref 3.8–10.5)

## 2025-07-01 PROCEDURE — 71250 CT THORAX DX C-: CPT | Mod: 26

## 2025-07-01 PROCEDURE — 83735 ASSAY OF MAGNESIUM: CPT

## 2025-07-01 PROCEDURE — 80053 COMPREHEN METABOLIC PANEL: CPT

## 2025-07-01 PROCEDURE — 72125 CT NECK SPINE W/O DYE: CPT | Mod: 26

## 2025-07-01 PROCEDURE — 70450 CT HEAD/BRAIN W/O DYE: CPT

## 2025-07-01 PROCEDURE — 36415 COLL VENOUS BLD VENIPUNCTURE: CPT

## 2025-07-01 PROCEDURE — 70450 CT HEAD/BRAIN W/O DYE: CPT | Mod: 26

## 2025-07-01 PROCEDURE — 71250 CT THORAX DX C-: CPT

## 2025-07-01 PROCEDURE — 99285 EMERGENCY DEPT VISIT HI MDM: CPT

## 2025-07-01 PROCEDURE — 99284 EMERGENCY DEPT VISIT MOD MDM: CPT | Mod: 25

## 2025-07-01 PROCEDURE — 96374 THER/PROPH/DIAG INJ IV PUSH: CPT | Mod: XU

## 2025-07-01 PROCEDURE — 85025 COMPLETE CBC W/AUTO DIFF WBC: CPT

## 2025-07-01 PROCEDURE — 72125 CT NECK SPINE W/O DYE: CPT

## 2025-07-01 PROCEDURE — 74177 CT ABD & PELVIS W/CONTRAST: CPT

## 2025-07-01 PROCEDURE — 74177 CT ABD & PELVIS W/CONTRAST: CPT | Mod: 26

## 2025-07-01 RX ORDER — ACETAMINOPHEN 500 MG/5ML
1000 LIQUID (ML) ORAL ONCE
Refills: 0 | Status: COMPLETED | OUTPATIENT
Start: 2025-07-01 | End: 2025-07-01

## 2025-07-01 RX ORDER — LIDOCAINE HYDROCHLORIDE 20 MG/ML
1 JELLY TOPICAL ONCE
Refills: 0 | Status: COMPLETED | OUTPATIENT
Start: 2025-07-01 | End: 2025-07-01

## 2025-07-01 RX ORDER — IBUPROFEN 200 MG
1 TABLET ORAL
Qty: 20 | Refills: 0
Start: 2025-07-01

## 2025-07-01 RX ORDER — METHOCARBAMOL 500 MG/1
2 TABLET, FILM COATED ORAL
Qty: 24 | Refills: 0
Start: 2025-07-01

## 2025-07-01 RX ORDER — METHOCARBAMOL 500 MG/1
1500 TABLET, FILM COATED ORAL ONCE
Refills: 0 | Status: COMPLETED | OUTPATIENT
Start: 2025-07-01 | End: 2025-07-01

## 2025-07-01 RX ADMIN — LIDOCAINE HYDROCHLORIDE 1 PATCH: 20 JELLY TOPICAL at 18:01

## 2025-07-01 RX ADMIN — METHOCARBAMOL 1500 MILLIGRAM(S): 500 TABLET, FILM COATED ORAL at 18:00

## 2025-07-01 RX ADMIN — Medication 400 MILLIGRAM(S): at 16:01

## 2025-07-01 RX ADMIN — Medication 1000 MILLILITER(S): at 16:01

## 2025-07-01 NOTE — ED ADULT NURSE REASSESSMENT NOTE - NS ED NURSE REASSESS COMMENT FT1
received pt from ORLIN Castaneda. Pt A&ox3. Pt in no acute distress. pt ambulating with steady gait.

## 2025-07-01 NOTE — ED PROVIDER NOTE - PHYSICAL EXAMINATION
General: well appearing female, no acute distress   HEENT: normocephalic, atraumatic   Respiratory: normal work of breathing  Cardiac: regular rate and rhythm   Abdomen: soft, non-tender, no guarding or rebound   Skin: warm, dry, left flank ecchymosis    Neuro: A&Ox3  Psych: appropriate affect

## 2025-07-01 NOTE — ED ADULT NURSE NOTE - OBJECTIVE STATEMENT
Patient is s/p fall on Saturday , slipped and fell while going into a pool. c/o bruise and pain to Lt flank, constipation , no BM since Saturday AM. Patient denies chest pain, no SOB, no N/V/D. Fall bundle activated. All fall measures placement delegated to PCA c heryl ..... Patient educated on fall prevention and possible risks of injuries. Patient verbalized understanding, agreed to call for assistance if needed and agrees to stay in bed for safety.

## 2025-07-01 NOTE — ED PROVIDER NOTE - PROGRESS NOTE DETAILS
Alfredo: Patient received at signout, seen and evaluated bedside.  Patient comfortable no acute distress.  Signout from Dr. Coy pending CT abdomen and pelvis.  CT abdomen pelvis showing "3 left lower rib fractures. Small left pleural effusion. No acute traumatic injury abdomen/pelvis. Incidental observations in the liver/adrenal gland, outpatient contrast-enhanced MRI may be considered."  Discussed with patient, pain well-controlled.  CT head/C-spine/chest ordered showing 4 rib fractures.  Pain well-controlled, patient demonstrating proper use of incentive spirometer, no hypoxia or increased work of breathing.  Offered trauma consult/admission for supportive treatment, patient declined stating she feels well and wants to go home.  Discussed outpatient follow-up, strict return precautions, patient understood and agreeable with plan.

## 2025-07-01 NOTE — ED PROVIDER NOTE - PATIENT PORTAL LINK FT
You can access the FollowMyHealth Patient Portal offered by [facility] by registering at the following website: [email or web address]. By joining [employer]’s FollowMyHealth portal, you will also be able to view your health information using other applications (apps) compatible with our system.

## 2025-07-01 NOTE — ED ADULT NURSE REASSESSMENT NOTE - NS ED NURSE REASSESS COMMENT FT1
incentive spirometer provided to patient and educated on how to use. pt performed back successfully.

## 2025-07-01 NOTE — ED ADULT NURSE NOTE - CHIEF COMPLAINT QUOTE
s/p fall on Saturday , slipped and fell while going into a pool  c/o bruise and pain to Lt flank   constipation , no BM since Saturday AM

## 2025-07-01 NOTE — ED ADULT NURSE NOTE - NSFALLRISKINTERV_ED_ALL_ED

## 2025-07-01 NOTE — ED PROVIDER NOTE - OBJECTIVE STATEMENT
74-year-old female presenting with left-sided flank and abdominal pain as well as constipation.  Patient reports 3 days ago she slipped and fell on her head and going to the pool and hit her left side.  Reports since then she has had left-sided pain whenever she moves and has also been constipated.  Reports she typically has 1-2 bowel movements a day.,  Nausea, vomiting, pain or burning with urination.

## 2025-07-01 NOTE — ED PROVIDER NOTE - NSFOLLOWUPINSTRUCTIONS_ED_ALL_ED_FT
Rib Fracture       A rib fracture is a break or crack in one of the bones of the ribs. The ribs are long, curved bones that wrap around your chest and attach to your spine and your breastbone. The ribs protect your heart, lungs, and other organs in the chest.    A broken or cracked rib is often painful but is not usually serious. Most rib fractures heal on their own over time. However, rib fractures can be more serious if multiple ribs are broken or if broken ribs move out of place and push against other structures or organs.    What are the causes?  This condition is caused by:    Repetitive movements with high force, such as pitching a baseball or having severe coughing spells.  A direct blow to the chest, such as a sports injury, a car accident, or a fall.  Cancer that has spread to the bones, which can weaken bones and cause them to break.    What are the signs or symptoms?  Symptoms of this condition include:    Pain when you breathe in or cough.  Pain when someone presses on the injured area.  Feeling short of breath.    How is this diagnosed?  This condition is diagnosed with a physical exam and medical history. Imaging tests may also be done, such as:    Chest X-ray.  CT scan.  MRI.  Bone scan.  Chest ultrasound.    How is this treated?  Treatment for this condition depends on the severity of the fracture. Most rib fractures usually heal on their own in 1–3 months. Sometimes healing takes longer if there is a cough that does not stop or if there are other activities that make the injury worse (aggravating factors). While you heal, you will be given medicines to control the pain. You will also be taught deep breathing exercises.    Severe injuries may require hospitalization or surgery.    Follow these instructions at home:      Managing pain, stiffness, and swelling    If directed, apply ice to the injured area.    Put ice in a plastic bag.  Place a towel between your skin and the bag.  Leave the ice on for 20 minutes, 2–3 times a day.  Take over-the-counter and prescription medicines only as told by your health care provider.        Activity    Avoid a lot of activity and any activities or movements that cause pain. Be careful during activities and avoid bumping the injured rib.  Slowly increase your activity as told by your health care provider.        General instructions    Do deep breathing exercises as told by your health care provider. This helps prevent pneumonia, which is a common complication of a broken rib. Your health care provider may instruct you to:    Take deep breaths several times a day.  Try to cough several times a day, holding a pillow against the injured area.  Use a device called incentive spirometer to practice deep breathing several times a day.  Drink enough fluid to keep your urine pale yellow.  Do not wear a rib belt or binder. These restrict breathing, which can lead to pneumonia.  Keep all follow-up visits as told by your health care provider. This is important.    Contact a health care provider if:  You have a fever.    Get help right away if:  You have difficulty breathing or you are short of breath.  You develop a cough that does not stop, or you cough up thick or bloody sputum.  You have nausea, vomiting, or pain in your abdomen.  Your pain gets worse and medicine does not help.    Summary  A rib fracture is a break or crack in one of the bones of the ribs.  A broken or cracked rib is often painful but is not usually serious.  Most rib fractures heal on their own over time.  Treatment for this condition depends on the severity of the fracture.  Avoid a lot of activity and any activities or movements that cause pain.

## (undated) DEVICE — SUT POLYSORB 2-0 30" GS-10 UNDYED

## (undated) DEVICE — HOOD FLYTE STRYKER HELMET SHIELD

## (undated) DEVICE — SUT QUILL PDO 2 36CM 48MM

## (undated) DEVICE — SAW BLADE STRYKER SAGITTAL DUAL CUT 18MMX90MMX1.27MM

## (undated) DEVICE — WRAP COMPRESSION CALF MED

## (undated) DEVICE — SPONGE LAP PAD W RING 18X18"

## (undated) DEVICE — ELCTR AQUAMANTYS BIPOLAR SEALER 6.0

## (undated) DEVICE — Device

## (undated) DEVICE — DRSG XEROFORM 1 X 8"

## (undated) DEVICE — SAW BLADE STRYKER SAGITTAL DUAL CUT 25X90X1.27MM

## (undated) DEVICE — DRAPE SHOWER CURTAIN ISOLATION

## (undated) DEVICE — FOR-TOURNIQUET 1130808443: Type: DURABLE MEDICAL EQUIPMENT

## (undated) DEVICE — NDL HYPO SAFE 22G X 1.5"

## (undated) DEVICE — WARMING BLANKET UPPER ADULT

## (undated) DEVICE — DRSG ACE BANDAGE 6"

## (undated) DEVICE — SUT POLYSORB 1 18" UNDYED

## (undated) DEVICE — SOL IRR POUR NS 0.9% 1500ML

## (undated) DEVICE — DRAPE IOBAN 33" X 23"

## (undated) DEVICE — LAP PAD W RING 18 X 18"

## (undated) DEVICE — DRAPE IOBAN 23X33"

## (undated) DEVICE — FOR-TOURNIQUET 1200909933: Type: DURABLE MEDICAL EQUIPMENT

## (undated) DEVICE — VENODYNE/SCD SLEEVE CALF MEDIUM

## (undated) DEVICE — TOURNIQUET CUFF 34" DUAL PORT (NAVY BLUE)

## (undated) DEVICE — DRSG MEPILEX 10 X 30CM (4 X 12") WHITE

## (undated) DEVICE — DRAPE 1/2 SHEET 40X57"

## (undated) DEVICE — BLANKET WARMER UPPER ADULT

## (undated) DEVICE — NDL HYPO SAFE 22G X 1.5" (BLACK)

## (undated) DEVICE — DRSG XEROFORM 1"

## (undated) DEVICE — FOR-ESU VALLEYLAB T7E14842DX: Type: DURABLE MEDICAL EQUIPMENT

## (undated) DEVICE — ELCTR GROUNDING PAD ADULT COVIDIEN

## (undated) DEVICE — GLV 8.5 PROTEXIS (WHITE)

## (undated) DEVICE — DRAPE HALF SHEET 40X57"

## (undated) DEVICE — STRYKER MIXEVAC 3 BONE CEMENT MIXER

## (undated) DEVICE — GOWN XXL

## (undated) DEVICE — SYS  PLATELET AUTOLOGOUS FIBRIN

## (undated) DEVICE — DRAPE ISOLATION W IOBAN & POUCH

## (undated) DEVICE — MIXEVAC III BONE CEMNT MIXER

## (undated) DEVICE — GLV 8.5 PROTEXIS

## (undated) DEVICE — SEALER BIPOLAR 6.0 AQUAMANTYS

## (undated) DEVICE — FOR-ESU VALLEYLAB T7E15009DX: Type: DURABLE MEDICAL EQUIPMENT